# Patient Record
Sex: MALE | Race: WHITE | Employment: OTHER | URBAN - METROPOLITAN AREA
[De-identification: names, ages, dates, MRNs, and addresses within clinical notes are randomized per-mention and may not be internally consistent; named-entity substitution may affect disease eponyms.]

---

## 2018-10-06 ENCOUNTER — HOSPITAL ENCOUNTER (EMERGENCY)
Age: 83
Discharge: HOME | End: 2018-10-06
Payer: MEDICARE

## 2018-10-06 ENCOUNTER — APPOINTMENT (OUTPATIENT)
Dept: CT IMAGING | Age: 83
End: 2018-10-06
Payer: MEDICARE

## 2018-10-06 VITALS
RESPIRATION RATE: 17 BRPM | SYSTOLIC BLOOD PRESSURE: 143 MMHG | TEMPERATURE: 97.6 F | WEIGHT: 195 LBS | DIASTOLIC BLOOD PRESSURE: 72 MMHG | BODY MASS INDEX: 27.92 KG/M2 | HEIGHT: 70 IN | HEART RATE: 78 BPM | OXYGEN SATURATION: 99 %

## 2018-10-06 DIAGNOSIS — S01.01XA LACERATION OF SCALP, INITIAL ENCOUNTER: ICD-10-CM

## 2018-10-06 DIAGNOSIS — S09.90XA INJURY OF HEAD, INITIAL ENCOUNTER: Primary | ICD-10-CM

## 2018-10-06 PROCEDURE — 90715 TDAP VACCINE 7 YRS/> IM: CPT | Performed by: NURSE PRACTITIONER

## 2018-10-06 PROCEDURE — 99283 EMERGENCY DEPT VISIT LOW MDM: CPT

## 2018-10-06 PROCEDURE — 6370000000 HC RX 637 (ALT 250 FOR IP): Performed by: NURSE PRACTITIONER

## 2018-10-06 PROCEDURE — 70450 CT HEAD/BRAIN W/O DYE: CPT

## 2018-10-06 PROCEDURE — 72125 CT NECK SPINE W/O DYE: CPT

## 2018-10-06 PROCEDURE — 12013 RPR F/E/E/N/L/M 2.6-5.0 CM: CPT

## 2018-10-06 PROCEDURE — 6360000002 HC RX W HCPCS: Performed by: NURSE PRACTITIONER

## 2018-10-06 PROCEDURE — 90471 IMMUNIZATION ADMIN: CPT | Performed by: NURSE PRACTITIONER

## 2018-10-06 RX ADMIN — Medication 1 EACH: at 16:24

## 2018-10-06 RX ADMIN — TETANUS TOXOID, REDUCED DIPHTHERIA TOXOID AND ACELLULAR PERTUSSIS VACCINE, ADSORBED 0.5 ML: 5; 2.5; 8; 8; 2.5 SUSPENSION INTRAMUSCULAR at 16:53

## 2018-10-06 ASSESSMENT — PAIN DESCRIPTION - DESCRIPTORS: DESCRIPTORS: ACHING

## 2018-10-06 ASSESSMENT — PAIN DESCRIPTION - LOCATION: LOCATION: HEAD

## 2018-10-06 ASSESSMENT — PAIN SCALES - GENERAL: PAINLEVEL_OUTOF10: 8

## 2018-10-06 NOTE — ED PROVIDER NOTES
additional lacerations requiring repair. 1700: Patient resting with no distress or focal neurologic deficits. Medical Decision Making:    Patient presented with mechanical fall with head injury and scalp laceration. Wound was thoroughly cleaned and repaired with Dermabond. CT of the head and cervical spine was negative for acute pathology. Patient was negative for focal neurologic deficits and is appropriate for discharge and outpatient follow-up. Patient wife are instructed to return to the emergency department immediately with any new or worsening symptoms. Counseling: The emergency provider has spoken with the patient and spouse/SO and discussed todays results, in addition to providing specific details for the plan of care and counseling regarding the diagnosis and prognosis. Questions are answered at this time and they are agreeable with the plan. Assessment      1. Injury of head, initial encounter    2. Laceration of scalp, initial encounter      Plan   Discharge to home  Patient condition is good    New Medications     New Prescriptions    No medications on file     Electronically signed by MICHELLE Pantoja CNP   DD: 10/6/18  **This report was transcribed using voice recognition software. Every effort was made to ensure accuracy; however, inadvertent computerized transcription errors may be present.   END OF ED PROVIDER NOTE     MICHELLE Reese CNP  10/06/18 8988

## 2019-09-22 ENCOUNTER — HOSPITAL ENCOUNTER (EMERGENCY)
Age: 84
Discharge: HOME | End: 2019-09-22
Attending: EMERGENCY MEDICINE
Payer: MEDICARE

## 2019-09-22 ENCOUNTER — APPOINTMENT (OUTPATIENT)
Dept: GENERAL RADIOLOGY | Age: 84
End: 2019-09-22
Payer: MEDICARE

## 2019-09-22 ENCOUNTER — APPOINTMENT (OUTPATIENT)
Dept: CT IMAGING | Age: 84
End: 2019-09-22
Payer: MEDICARE

## 2019-09-22 VITALS
HEART RATE: 55 BPM | WEIGHT: 202 LBS | RESPIRATION RATE: 18 BRPM | OXYGEN SATURATION: 96 % | HEIGHT: 68 IN | TEMPERATURE: 98.4 F | DIASTOLIC BLOOD PRESSURE: 87 MMHG | BODY MASS INDEX: 30.62 KG/M2 | SYSTOLIC BLOOD PRESSURE: 123 MMHG

## 2019-09-22 DIAGNOSIS — S02.19XA CLOSED FRACTURE OF FRONTAL SINUS, INITIAL ENCOUNTER (HCC): ICD-10-CM

## 2019-09-22 DIAGNOSIS — S09.90XA CLOSED HEAD INJURY, INITIAL ENCOUNTER: ICD-10-CM

## 2019-09-22 DIAGNOSIS — S49.91XA INJURY OF RIGHT SHOULDER, INITIAL ENCOUNTER: ICD-10-CM
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
## 2019-09-22 PROCEDURE — 73060 X-RAY EXAM OF HUMERUS: CPT

## 2019-09-22 PROCEDURE — 99284 EMERGENCY DEPT VISIT MOD MDM: CPT

## 2019-09-22 PROCEDURE — 72125 CT NECK SPINE W/O DYE: CPT

## 2019-09-22 PROCEDURE — 70450 CT HEAD/BRAIN W/O DYE: CPT

## 2019-09-22 ASSESSMENT — ENCOUNTER SYMPTOMS
NAUSEA: 0
ABDOMINAL PAIN: 0
SHORTNESS OF BREATH: 0
SORE THROAT: 0
COUGH: 0
DIARRHEA: 0
VOMITING: 0
BACK PAIN: 0
CONSTIPATION: 0
BLOOD IN STOOL: 0

## 2019-09-22 ASSESSMENT — PAIN DESCRIPTION - ORIENTATION: ORIENTATION: RIGHT

## 2019-09-22 ASSESSMENT — PAIN SCALES - GENERAL: PAINLEVEL_OUTOF10: 10

## 2019-09-22 ASSESSMENT — PAIN DESCRIPTION - LOCATION: LOCATION: SHOULDER

## 2019-09-22 ASSESSMENT — PAIN DESCRIPTION - DESCRIPTORS: DESCRIPTORS: ACHING

## 2019-09-22 ASSESSMENT — PAIN DESCRIPTION - PAIN TYPE: TYPE: ACUTE PAIN

## 2019-09-22 NOTE — ED PROVIDER NOTES
Patient is an 72-year-old male with history of arthritis, who is presenting for evaluation after fall. Fall occurred yesterday at 1800, patient was walking through a doorway missed the step and fell forward into the right. Patient states he scraped his head up on gravel and landed on his right shoulder. Notes this morning he has decreased movement of right arm as well as headache. Denies any headache, dizziness, lightheadedness, chest pain, shortness of breath, nausea, vomiting. No amnesia to the event. He did not lose consciousness is not on any blood thinners. He has been ambulatory since the event. The history is provided by the patient. Fall   The accident occurred yesterday. The fall occurred while walking. Impact surface: gravel. The point of impact was the head, right shoulder and right elbow. The pain is present in the head and right shoulder. The pain is moderate. He was ambulatory at the scene. There was no entrapment after the fall. There was no drug use involved in the accident. There was no alcohol use involved in the accident. Associated symptoms include headaches. Pertinent negatives include no fever, no numbness, no abdominal pain, no nausea and no vomiting. Review of Systems   Constitutional: Negative for chills and fever. HENT: Negative for sore throat. Eyes: Negative for visual disturbance. Respiratory: Negative for cough and shortness of breath. Cardiovascular: Negative for chest pain. Gastrointestinal: Negative for abdominal pain, blood in stool, constipation, diarrhea, nausea and vomiting. Genitourinary: Negative for difficulty urinating, dysuria and urgency. Musculoskeletal: Negative for back pain. Right arm pain     Skin: Negative for rash. Neurological: Positive for headaches. Negative for dizziness and numbness. Physical Exam   Constitutional: He is oriented to person, place, and time. He appears well-developed and well-nourished.  No medical, family and social history unless otherwise noted. I have discussed this patient in detail with the resident and provided the instruction and education regarding the evidence-based evaluation and treatment of [unfilled]  History: patient tripped and fell yesterday. He struck his forehead on pavement and injured his right shoulder. He denies LOC, neck or back pain. His tetanus is UTD. My findings: Sapna Horne is a 82 y.o. male who is in no distress. Physical exam reveals head with abrasions of the forehead and ecchymosis of the right orbit. Tenderness of the right anterior shoulder with decreased ROM secondary to pain. Distal motion, pulses and sensation intact. No focal neurologic deficits. No midline spine tenderness. My plan: Symptomatic and supportive care. Patient to have imaging. Electronically signed by Rosie Welsh DO on 9/22/19 at 10:07 AM          [JS]   1105 Repeat evaluation, patient continues to remain non-toxic. EOM intact without deficit. [BB]      ED Course User Index  [BB] Demetrius Perez DO  [JS] Rosie Welsh DO       --------------------------------------------- PAST HISTORY ---------------------------------------------  Past Medical History:  has a past medical history of Cancer ([facility] [address] and Prostate irregularity. Past Surgical History:  has a past surgical history that includes hernia repair; Skin cancer excision; and Leg Surgery (Left, 2011). Social History:  reports that he has never smoked. He has never used smokeless tobacco. He reports that he does not drink alcohol or use drugs. Family History: family history is not on file. The patients home medications have been reviewed. Allergies: Patient has no known allergies. -------------------------------------------------- RESULTS -------------------------------------------------  Labs:  No results found for this visit on 09/22/19.     Radiology:  XR HUMERUS RIGHT (MIN 2 VIEWS)   Final Result   No acute findings. CT Head WO Contrast   Final Result   1. No acute intracranial abnormality. Right mastoid fluid without   fracture. 2. Scalp swelling right frontal region with fracture of the anterior   wall of the right frontal sinus. CT Cervical Spine WO Contrast   Final Result   1. 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 2. 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 This was   not identified on the prior CT scan of the cervical spine 10/6/2018.                ------------------------- NURSING NOTES AND VITALS REVIEWED ---------------------------  Date / Time Roomed:  9/22/2019  9:29 AM  ED Bed Assignment:  01/01    The nursing notes within the ED encounter and vital signs as below have been reviewed. /87   Pulse 55   Temp 98.4 °F (36.9 °C) (Oral)   Resp 18   Ht 5' 8\" (1.727 m)   Wt 202 lb (91.6 kg)   SpO2 96%   BMI 30.71 kg/m²   Oxygen Saturation Interpretation: Normal      ------------------------------------------ PROGRESS NOTES ------------------------------------------  9:05 AM  I have spoken with the patient and discussed todays results, in addition to providing specific details for the plan of care and counseling regarding the diagnosis and prognosis. Their questions are answered at this time and they are agreeable with the plan. I discussed at length with them reasons for immediate return here for re evaluation. They will followup with their [facility] and primary care physician by calling their office tomorrow. --------------------------------- ADDITIONAL PROVIDER NOTES ---------------------------------  At this time the patient is without objective evidence of an acute process requiring hospitalization or inpatient management. They have remained hemodynamically stable throughout their entire ED visit and are stable for discharge with outpatient follow-up.      The plan has been discussed in detail and they are aware of

## 2020-01-27 ENCOUNTER — HOSPITAL ENCOUNTER (EMERGENCY)
Age: 85
Discharge: HOME | End: 2020-01-27
Payer: MEDICARE

## 2020-01-27 VITALS
BODY MASS INDEX: 30.11 KG/M2 | RESPIRATION RATE: 20 BRPM | DIASTOLIC BLOOD PRESSURE: 78 MMHG | TEMPERATURE: 98 F | HEART RATE: 85 BPM | WEIGHT: 198 LBS | SYSTOLIC BLOOD PRESSURE: 131 MMHG | OXYGEN SATURATION: 95 %

## 2020-01-27 PROCEDURE — 99212 OFFICE O/P EST SF 10 MIN: CPT

## 2020-01-27 RX ORDER — AMOXICILLIN AND CLAVULANATE POTASSIUM 875; 125 MG/1; MG/1
1 TABLET, FILM COATED ORAL 2 TIMES DAILY
Qty: 20 TABLET | Refills: 0 | Status: SHIPPED | OUTPATIENT
Start: 2020-01-27 | End: 2020-02-06

## 2020-01-27 NOTE — ED PROVIDER NOTES
Department of Emergency Medicine   [address] [place] [address]  Provider Note  Admit Date/RoomTime: 1/27/2020  8:58 AM  Room: 04/04  Chief Complaint   Sinusitis (started week ago   cough  sinus pressure  congestion )    History of Present Illness   Source of history provided by:  patient. History/Exam Limitations: none. Tani Hallman is a 82 y.o. old male who has a past medical history of:   Past Medical History:   Diagnosis Date    Cancer ([address]     skin    Prostate irregularity    presents to the urgent care center by private vehicle, for his congestion and drainage and a dry throat. He said he does not have chest pain or shortness of breath. He said it started over a week ago and he is just not getting over it and now it is turned to green color with the sinus drainage. He denies chest pain shortness of breath or body aches. ROS    Pertinent positives and negatives are stated within HPI, all other systems reviewed and are negative. Past Surgical History:   Procedure Laterality Date    HERNIA REPAIR      LEG SURGERY Left 2011    SKIN CANCER EXCISION     Social History:  reports that he has never smoked. He has never used smokeless tobacco. He reports that he does not drink alcohol or use drugs. Family History: family history is not on file. Allergies: Patient has no known allergies. Physical Exam            ED Triage Vitals [01/27/20 0859]   BP Temp Temp Source Pulse Resp SpO2 Height Weight   131/78 98 °F (36.7 °C) Oral 85 20 95 % -- 198 lb (89.8 kg)      Oxygen Saturation Interpretation: Normal.    Constitutional:  Alert, development consistent with age. Ears:  External Ears: Bilateral normal.               TM's & External Canals: normal appearance, normal TMs bilaterally. Nose:   There is no discharge, swelling or lesions noted. Sinuses: no Bilateral maxillary sinus tenderness. no Bilateral frontal sinus tenderness.   Mouth:  normal tongue and buccal [facility] [id number]  [phone]      As needed   Patient condition is good    New Medications     New Prescriptions    AMOXICILLIN-CLAVULANATE (AUGMENTIN) 875-125 MG PER TABLET    Take 1 tablet by mouth 2 times daily for 10 days     Electronically signed by MICHELLE Perez CNP   DD: 1/27/20  **This report was transcribed using voice recognition software. Every effort was made to ensure accuracy; however, inadvertent computerized transcription errors may be present.   END OF ED PROVIDER NOTE     MICHELLE Perez CNP  01/27/20 2749

## 2020-12-28 ENCOUNTER — EVALUATION (OUTPATIENT)
Dept: OCCUPATIONAL THERAPY | Age: 85
End: 2020-12-28
Payer: MEDICARE

## 2020-12-28 PROCEDURE — 97110 THERAPEUTIC EXERCISES: CPT | Performed by: OCCUPATIONAL THERAPIST

## 2020-12-28 PROCEDURE — 97165 OT EVAL LOW COMPLEX 30 MIN: CPT | Performed by: OCCUPATIONAL THERAPIST

## 2020-12-28 NOTE — PROGRESS NOTES
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 Nalini Baker   Date of Surgery/Injury: no injury - long standing duration  Plan of care signed (Y/N): N  Visit# / total visits:      Past Medical History:   Past Medical History:   Diagnosis Date    Cancer ([facility] [address]     skin    Prostate irregularity      Past Surgical History:   Past Surgical History:   Procedure Laterality Date    HERNIA REPAIR      LEG SURGERY Left     SKIN CANCER EXCISION         Reason for Referral: Pt has long standing H/o OA in both his hands with his L being worse. He has a trigger finger release in his MF  On his L hand about 5 years ago and did well with that recovery. He has noticed about 6 months ago that  his L hand RF was beginning to get 'stuck ' in flexion in the AM and has briggs pain. He has been wearing a finger splint ( reversed swan neck ) at night so he is not fisting in the am.  He is also going to be seen in PT for neck and R shoulder. Pt c/o decreased strength in his L hand and soreness in his L RF and thumb ( [facility]). Home Living: lives in a house with his wife.    Prior Level of Function: Indep     Cognition:   Alert/Oriented x3     IADL STATUS:   Ind Mod I Min A Mod A Max A Dep Other   Homemaking Responsibility x         Shopping Responsibility: x         Mode of Transportation: x         Leisure & Hobbies:  x        Work:       x Comments: PT's wife it the primary cook and . He has difficulty opening jars. He does the yard work and snow removal with a riding mower and snow plow in his tractor. Hobbies include working on house projects, working on cars, and [address]. He has difficulty holding tightly onto a wrench or screw  with his L hand. Pt is retired. ADL STATUS:   Ind Mod I Min A Mod A Max A Dep Other   Feeding: x         Grooming: x         Bathing: x         UE Dressing: x         LE Dressing: x         Toileting: x         Transfers: x           Comments: NA    Pain Level: Pt states the  pain at rest in his L hand ranges from 0 - 3/10.  - achy and also has a sore L CMC area. Pain with light ADL tasks ranges from 0 - 4/10. Pain with resistive tasks from 5-8/10. Pain is 8/10 when trying to get his wallet of of his L back pocket with his L hand. He uses an asprin topical lotion to decrease pain and heat. UE Assessment:  L UE has AROM and MG WFL's in his shoulder, elbow, forearm and wrist.  Digital motion is WFL's with limitations as follows:  DPC: All digits WNL's except RF at 1 cm              RF MP ext -25*                    PIP ext -5*  Comment: Hand Dominance is Right     Sensation: WNL's   Able To Sense (Y) / Unable to Sense (N)  SEMMES-KRISTY Thumb 2nd Digit 3rd Digit  4th Digit  5th Digit    Normal Touch  Size: 2.83        Diminished Light Touch   Size: 3.61        Diminished Protective Sense  Size: 4.31        Loss of Protective Sense   Size: 4.56        Loss of Sensation  Size: 6.65            Edema Description/Circumferential Measurements:   Pt has slight edema in all digits in his L hand.      Dynamometer (setting 2):     Left: 25#      Right: 84#      Pinch Meter:   Lateral: Left= 14# ,    Right= 17#    Palmar 3 point: Left= 6#,    Right= 10#    QuickDASH Score:   % disability ( will assess next session) Intervention: Pt was given  HEP - see attached sheet. This included instructions of positions to avoid to allow his trigger finger to heal - no fisting, no resistive gripping, no tight gripping. Pt was given tendon gliding exercises - MP flex/ext and hook fisting only. He is to continue to wear his night ext splint. Therapist also instructed him on using ice tot he A -1 pulley area 3 x's a day either with direct icing or an ice pack. Pt appeared to understand all instructions. Goals were mutually agreed upon with the patient. Therapist explained why strengthening was not a goal at this time - to let his trigger finger heal.      Assessment of current deficits   Functional mobility []  ADLs [] Strength [x]  Cognition []  Functional transfers  [] IADLs [] Safety Awareness []  Endurance []  Fine Motor Coordination [] Balance [] Vision/perception [] Sensation []   Gross Motor Coordination [] ROM [x] Pain [x]  Edema [x]      Eval Complexity: LOW level eval charged, there exer x's one. Profile and History- history from pt and physician notes  Assessment of Occupational Performance and Identification of Deficits- PT has 4 functional deficits.     Clinical Decision Making- no adpatations needed    Rehab Potential:                                 [x] Good  [] Fair  [] Poor        Suggested Professional Referral:       [x] No  [] Yes:  Barriers to Goal Achievement[de-identified]          [x] No  [] Yes:  Domestic Concerns:                           [x] No  [] Yes:     Goal Formulation: Patient  Time In: 9:00 am             Time Out: 9:55 am                      Timed Code Treatment Minutes:  55 minutes     PLAN      Treatment to include:   [x] Instruction in HEP                   Modalities:  [x] Therapeutic Exercise        [] Ultrasound               [] Electrical Stimulation/Attended  [x] PROM/Stretching                    [x] Fluidotherapy          [x]  Paraffin [] AAROM  [x] AROM                 [] Iontophoresis: 4 mg/mL; Dexamethasone Sodium                                        [] Neuromuscular Re-education [] Splinting         [] Desensitization          [x] Therapeutic Activity       [x] Pain Management with/without modalities PRN                 [x] Manual Therapy/Fascial release                            [x] Tendon Glides        [x]Joint Protection/Training  []Ergonomics                             [x] Joint Mobilization        [] Adaptive Equipment Assessment/Training                             [x] Manual Edema Mobilization    [] Energy Conservation/Work Simplification  [x] GM/FM Coordination       [] Safety retraining/education per  individual diagnosis/goals  [] Scar Management        [] ADL/IADL re-training       Patient Specific Goal: To get his L hand stronger and help it so it does not get 'stuck' in flexion. GOALS (Long term same as Short term):  1. ) Patient will demonstrate good understanding of home program (exercises/activities/diagnosis/prognosis/goals) with good accuracy. 2. ) Patient will demonstrate increased active/passive range of motion of their L RF to [facility] for ADL/IADL completion. 3. ) Patient will demonstrate increased /pinch strength of at least 10 / 5 pinch pounds of their L hand. 4. ) Patient to report decreased pain in their affected Left  upper extremity from 8/10 to 2/10 or less with resistive functional use.   5, ) Patient will be knowledgeable of edema control techniques as evident with decreases from slight  to mild/none. 6. ) Patient will decrease QuickDASH score to 10% or less for increased participation in daily functional activities. Patient. Education:  [x] Plans/Goals, Risks/Benefits discussed  [x] Home exercise program  Method of Education: [x] Verbal  [x] Demo  [x] Written  Comprehension of Education:  [x] Verbalizes understanding. [x] Demonstrates understanding.

## 2020-12-29 PROBLEM — M65.342 TRIGGER FINGER, LEFT RING FINGER: Status: ACTIVE | Noted: 2020-12-29

## 2020-12-29 PROBLEM — M19.042 PRIMARY OSTEOARTHRITIS OF LEFT HAND: Status: ACTIVE | Noted: 2020-12-29

## 2021-01-05 ENCOUNTER — TREATMENT (OUTPATIENT)
Dept: OCCUPATIONAL THERAPY | Age: 86
End: 2021-01-05
Payer: MEDICARE

## 2021-01-05 ENCOUNTER — EVALUATION (OUTPATIENT)
Dept: PHYSICAL THERAPY | Age: 86
End: 2021-01-05
Payer: MEDICARE

## 2021-01-05 DIAGNOSIS — M19.042 PRIMARY OSTEOARTHRITIS OF LEFT HAND: ICD-10-CM

## 2021-01-05 DIAGNOSIS — M65.342 TRIGGER FINGER, LEFT RING FINGER: Primary | ICD-10-CM

## 2021-01-05 DIAGNOSIS — M54.2 NECK PAIN: ICD-10-CM

## 2021-01-05 DIAGNOSIS — M25.511 RIGHT SHOULDER PAIN, UNSPECIFIED CHRONICITY: Primary | ICD-10-CM

## 2021-01-05 PROCEDURE — 97162 PT EVAL MOD COMPLEX 30 MIN: CPT | Performed by: PHYSICAL THERAPIST

## 2021-01-05 PROCEDURE — 97110 THERAPEUTIC EXERCISES: CPT | Performed by: OCCUPATIONAL THERAPIST

## 2021-01-05 PROCEDURE — 97760 ORTHOTIC MGMT&TRAING 1ST ENC: CPT | Performed by: OCCUPATIONAL THERAPIST

## 2021-01-05 PROCEDURE — 97140 MANUAL THERAPY 1/> REGIONS: CPT | Performed by: OCCUPATIONAL THERAPIST

## 2021-01-05 PROCEDURE — 97110 THERAPEUTIC EXERCISES: CPT | Performed by: PHYSICAL THERAPIST

## 2021-01-05 NOTE — PROGRESS NOTES
Physical Therapy Daily Treatment Note    Date: 2021  Patient Name: Violeta Candelario  : 1935   MRN: [id number]  DOInjury: 2 months ago  DOSx: None   Referring Provider: No referring provider defined for this encounter. Medical Diagnosis:      Diagnosis Orders   1. Right shoulder pain, unspecified chronicity     2. Neck pain         Outcome Measure: Quick Dash: 34% Impairment    S: See eval  O: Pt given written HEP  Time 9797-3537     Visit 1 Repeat outcome measure at mid point and end. Pain 5/10     ROM See eval     Modalities            Manual 10 mins flex, abd, IR, ER                 Stretch      Table slides      Wall Walk Flex      Wall Walk ABD      Wall ER Stretch      Towel IR Stretch      Supine Stretch with Arms Behind Head            Exercise      UBE          Pulleys - Flex      Pulleys - IR      Supine Wand Flex 15 reps x 1 set      Supine Wand Bench Press 15 reps x 1 set      Supine Wand ER/IR 15 reps x 1 set      Standing Wand IR 15 reps x 1 set      Standing Wand Scaption 15 reps x 1 set      Standing Wand Flex      Standing Wand ER/IR      Shrugs AROM       Pendulum Ex            Supine Flex      S-lying ABD      S-lying ER            Prone Flexion      Prone Ext      Prone Row with ER      Prone Horizontal ABD            Standing Flex      Standing ER/IR      Standing Scaption       Standing ABD      Standing Shoulder Press       Functional activities To aid in ROM and strength needed for reaching , lifting ,pushing and pulling at home/work    ROWS: H       ROWS: M  \"    ROWS: L  \"    ER  \"    IR  \"    Shoulder Flex      Shoulder ABD            Weighted Machines      Lat Pull Down      Vertical Row      A:  Tolerated well. Above added to written HEP.   P: Continue with rehab plan  Steven Santos PT    Treatment Charges: Mins Units   Initial Evaluation 30 1   Re-Evaluation     Ther Exercise         TE 15 1   Manual Therapy     MT     Ther Activities        TA Gait Training          GT     Neuro Re-education NR     Modalities     Non-Billable Service Time     Other     Total Time/Units 45 2

## 2021-01-05 NOTE — PROGRESS NOTES
[facility] OUTPATIENT REHABILITATION  PHYSICAL THERAPY INITIAL EVALUATION         Date:  2021   Patient: Mitch Candelario  : 1935  MRN: [id number]  Referring Provider: No referring provider defined for this encounter. Medical Diagnosis:      Diagnosis Orders   1. Right shoulder pain, unspecified chronicity     2. Neck pain          SUBJECTIVE:     Onset date: 2 months prior. Onset[de-identified] Sudden onset    Mechanism of Injury / History: Pt was leaning over going to  something off the ground and fell onto his R shoulder. XR was negative but pt still c/o pain and dysfunction since event. Patient is right handed. Previous PT: none    Medical Management for Current Problem: OTC meds     Chief complaint: pain, decreased motion, decreased mobility and weakness    Behavior: condition is staying the same    Pain: intermittent  Current: 2/10     Best: 0/10     Worst:7/10    Symptom Type/Quality: aching, sharp, burning   Location[de-identified] noted above anterior, mid-deltoid region      Aggravated by: reaching overhead, reaching out, reaching behind back, lifting/carrying/material handling    Relieved by: medication      Imaging results: No results found. Past Medical History:  Past Medical History:   Diagnosis Date    Cancer ([address]     skin    Prostate irregularity      Past Surgical History:   Procedure Laterality Date    HERNIA REPAIR      LEG SURGERY Left     SKIN CANCER EXCISION         Medications:   Current Outpatient Medications   Medication Sig Dispense Refill    Cholecalciferol (VITAMIN D) 2000 units CAPS capsule Take 1 capsule by mouth daily      doxazosin (CARDURA) 4 MG tablet Take 4 mg by mouth nightly      esomeprazole Magnesium (NEXIUM) 20 MG PACK Take 20 mg by mouth daily       No current facility-administered medications for this visit. Occupation: retired. Physical demands include: n/a. Status: n/a.     Exercise regimen: none Hobbies: yard work, working around the house    Patient Goals: pain relief, return to prior activity, get back to normal    Precautions/Contraindications: none    OBJECTIVE:     Observations: well nourished male    Inspection: irregular scapulohumeral rhythm right shoulder                 Palpation: Tender to palpation anterior mid deltoid     Joint/Motion:  Right Shoulder:  AROM: 130° Forward elevation,  75° ER,  IR to 30  PROM: 140° Forward elevation,  85° ER,  40° IR    Left Shoulder:  AROM: WNL° Forward elevation,  WNL° ER,  IR to WNL  PROM: WNL° Forward elevation,  WNL° ER , WNL° IR    Strength:  Right Shoulder: Flexion 3-/5,  Abduction 3-/5, ER 3-/5, IR 3-/5      Left Shoulder: Flexion 4-/5,  Abduction 4-/5, ER 4-/5, IR 4-/5       Special Tests/Functional Screens:    [] Speedy []+ / [] -  [] Watonwan's []+ / [] -   []  Tex's []+ / [] -    []GH drawer []+ / [] -    [] Bicep Load []+ / [] -   [] Crank []+ / [] -  [] [facility] []+ / [] -   [] Elbow Varus []+ / [] -  [] Neer's []+ / [] -      [] Speed's []+ / [] -   []Sulcus Sign []+ / [] -   [] Apprehension []+ / [] -   [] Bicep Load II []+ / [] -   [] Jocelyne Trevizo []+ / [] -   [] Elbow Valgus []+ / [] -     [] Other: []+ / [] -         ASSESSMENT     Outcome Measure:   QuickDASH (Disorders of the Arm, Shoulder, and Hand) 34% disability    Problems:   ? Pain reported 7/10  ? ROM decreased  ? Strength decreased 3-/5 R shoulder  ? Decreased functional ability with walking, stairs, sitting, standing, ADLs, use of right upper extremity, reaching, lifting, carrying    [x] There are no barriers affecting plan of care or recovery    [] Barriers to this patient's plan of care or recovery include. Domestic Concerns:  [x] No  [] Yes:    Short Term goals (2-3 weeks)  ? Decrease reported pain to 4/10  ? Increase ROM to WNL  ?  Increase Strength to 3+/5 R shoulder Patient understands diagnosis/prognosis and consents to treatment, plan and goals: [x] Yes    [] No     Thank you for the opportunity to work with your patient. If you have questions or comments, please contact me at [phone]; fax: [phone]. Electronically signed by: Indy Ingram, PT    Medicare Patients Only     Please sign Physician's Certification and return to: [address] [address]  Dept: [phone]  Dept Fax: [phone] Certification / Comments     Frequency/Duration 1-2 days per week for 4-6 weeks. Certification period from 1/5/2021  to 4/4/2021. I have reviewed the Plan of Care established for skilled therapy services and certify that the services are required and that they will be provided while the patient is under my care.     Physician's Comments/Revisions:               Physician's Printed Name:                                           [de-identified] Signature:                                                               Date:

## 2021-01-05 NOTE — PROGRESS NOTES
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 Adeola Frank   Date of Surgery/Injury: no injury - long standing duration  Plan of care signed (Y/N): N  Visit# / total visits:      Pain Level: moderate,  Sore, tight (pulling) and uncomfortable    Subjective: \" My finger has been really sore since I started doing the exercises. .\"  Objective:  Updated POC to be completed by visit 8. INTERVENTION: COMPLETED: SPECIFICS/COMMENTS:   Modality:     MH x hand x 5 min as preconditioning prior to ex        AROM:     L hand AROM/ tendon glides x Focus on isolated MCP flexion/ ext, PIP/DIP curls using red dowel        AAROM:               PROM/Stretching:               Manual techniques:     Soft tissue mob x Ring finger- full including over A1 pulley        Strengthening:               Other:     Splinting- NEW x Hand based ring MP blocking splint fabricated for use during home PT exercises and other tasks to prevent triggering. Assessment/Comments: Pt is making Good progress toward stated plan of care. Tx completed with a focus on the trigger finger. Home AROM program modified some to prevent excessive soreness. Pt reported increased triggering in the affected finger when doing PT exercises for his shoulder. A hand based splint was fabricated to prevent triggering while permitting functional hand use.      -Rehab Potential: Good  -Requires OT Follow Up: Yes  Time In:1405            Time Out: 1505             Visit #: 2    Treatment Charges: Mins Units Modalities     Ther Exercise 15 1   Manual Therapy 15 1   Thera Activities     ADL/Home Mgt      Neuro Re-education     Gait Training     Group Therapy     Non-Billable Service Time: MH 5    Other: ortho fit 25 2   Total Time/Units 60 4       -Response to Treatment: Pt is happy with his progress. He states he has a MD appointment setup to see Dr Cynthia Ye. Goals: Goals for pt can be see on initial eval occurring on 12-28-20    Plan:   [x]  Continue Plan of care: Contiunue pain mgmt, ROM and splint checks as needed. Treatment covered based on POC and graduated to patient's progress. Pt education continues at each visit to obtain maximum benefits from skilled OT intervention.   []  [facility] of care:   []  Discharge:      Trixie Byrd OT/L  [id number]

## 2021-01-07 ENCOUNTER — TREATMENT (OUTPATIENT)
Dept: PHYSICAL THERAPY | Age: 86
End: 2021-01-07
Payer: MEDICARE

## 2021-01-07 DIAGNOSIS — M54.2 NECK PAIN: ICD-10-CM

## 2021-01-07 DIAGNOSIS — M25.511 RIGHT SHOULDER PAIN, UNSPECIFIED CHRONICITY: Primary | ICD-10-CM

## 2021-01-07 PROCEDURE — 97110 THERAPEUTIC EXERCISES: CPT

## 2021-01-07 NOTE — PROGRESS NOTES
Physical Therapy Daily Treatment Note    Date: 2021  Patient Name: Estuardo Branham  : 1935   MRN: [id number]  DOInjury: 2 months ago  DOSx: None   Referring Provider:  Geronimo Howell MD    Medical Diagnosis:      Diagnosis Orders   1. Right shoulder pain, unspecified chronicity     2. Neck pain         Outcome Measure: Quick Dash: 34% Impairment    S: Pt reports increased soreness for a couple days from doing HEP twice daily. This AM he felt slightly better  O: Pt given written HEP  Time 0008-5601     Visit 2 Repeat outcome measure at mid point and end. Pain 5/10     ROM See eval     Modalities      HP X 10 min after Tx R shoulder    Manual                  Stretch      Table slides X 15 flexion  X 15 scaption     Wall Walk Flex      Wall Walk ABD      Wall ER Stretch      Towel IR Stretch      Supine Stretch with Arms Behind Head            Exercise      UBE          Pulleys - Flex      Pulleys - IR      Supine Wand Flex 15 reps x 1 set      Supine Wand Bench Press 15 reps x 1 set      Supine Wand ER/IR 15 reps x 1 set      Standing Wand IR 15 reps x 1 set      Standing Wand Scaption      Standing Wand Flex      Standing Wand ER/IR      Shrugs AROM       Pendulum Ex X 2 min           Supine Flex      S-lying ABD      S-lying ER            Prone Flexion      Prone Ext      Prone Row with ER      Prone Horizontal ABD            Standing Flex      Standing ER/IR      Standing Scaption       Standing ABD      Standing Shoulder Press       Functional activities To aid in ROM and strength needed for reaching , lifting ,pushing and pulling at home/work    ROWS: H       ROWS: M  \"    ROWS: L  \"    ER  \"    IR  \"    Shoulder Flex      Shoulder ABD            Weighted Machines      Lat Pull Down      Vertical Row      A:  Tolerated well. Above added to written HEP.   P: Continue with rehab plan  Lin Arvizu PTA    Treatment Charges: Mins Units   Initial Evaluation     Re-Evaluation Ther Exercise         TE 35 2   Manual Therapy     MT     Ther Activities        TA     Gait Training          GT     Neuro Re-education NR     Modalities     Non-Billable Service Time 10 HP 0   Other     Total Time/Units 45 2

## 2021-01-11 ENCOUNTER — TREATMENT (OUTPATIENT)
Dept: PHYSICAL THERAPY | Age: 86
End: 2021-01-11
Payer: MEDICARE

## 2021-01-11 DIAGNOSIS — M25.511 RIGHT SHOULDER PAIN, UNSPECIFIED CHRONICITY: Primary | ICD-10-CM

## 2021-01-11 DIAGNOSIS — M54.2 NECK PAIN: ICD-10-CM

## 2021-01-11 PROCEDURE — 97110 THERAPEUTIC EXERCISES: CPT

## 2021-01-11 NOTE — PROGRESS NOTES
Physical Therapy Daily Treatment Note    Date: 2021  Patient Name: Juanita Pereyra  : 1935   MRN: [id number]  DOInjury: 2 months ago  DOSx: None   Referring Provider:  Cece Bartlett MD    Medical Diagnosis:      Diagnosis Orders   1. Right shoulder pain, unspecified chronicity     2. Neck pain         Outcome Measure: Quick Dash: 34% Impairment    S: Pt reports shoulder feels better. Pain has decreased, but still sore with overhead movements  O: Pt given written HEP  Time 7233-7368     Visit 3 Repeat outcome measure at mid point and end. Pain 5/10     ROM See eval     Modalities      HP  R shoulder    Manual                  Stretch      Table slides      Wall Walk Flex      Wall Walk ABD      Wall ER Stretch      Towel IR Stretch      Supine Stretch with Arms Behind Head            Exercise      UBE          Pulleys - Flex X 3 min     Pulleys - IR X 3 min     Supine Wand Flex 15 reps x 1 set      Supine Wand Bench Press 15 reps x 1 set      Supine Wand ER/IR 15 reps x 1 set      Standing Wand IR 15 reps x 1 set      Standing Wand Scaption 15 reps x 1 set     Standing Wand Flex      Standing Wand ER/IR      Shrugs AROM       Pendulum Ex            Supine Flex NEXT     S-lying ABD      S-lying ER NEXT           Prone Flexion      Prone Ext      Prone Row with ER      Prone Horizontal ABD            Standing Flex X 15     Standing ER/IR X 15 W/ towel under arm    Standing Scaption       Standing ABD      Standing Shoulder Press       Functional activities To aid in ROM and strength needed for reaching , lifting ,pushing and pulling at home/work    ROWS: H       ROWS: M  \"    ROWS: L  \"    ER  \"    IR  \"    Shoulder Flex      Shoulder ABD            Weighted Machines      Lat Pull Down      Vertical Row      A:  Tolerated well.     P: Continue with rehab plan  Cesar Parmar PTA    Treatment Charges: Mins Units   Initial Evaluation     Re-Evaluation     Ther Exercise         TE 35 2 Manual Therapy     MT     Ther Activities        TA     Gait Training          GT     Neuro Re-education NR     Modalities     Non-Billable Service Time     Other     Total Time/Units 35 2

## 2021-01-12 ENCOUNTER — TELEPHONE (OUTPATIENT)
Dept: OCCUPATIONAL THERAPY | Age: 86
End: 2021-01-12

## 2021-01-12 NOTE — TELEPHONE ENCOUNTER
Patient has cancelled OT for 1-15-21 due to poor tolerance for therapy. He wishes to be placed on hold pending consult with Dr Gurvinder Oscar.

## 2021-02-19 ENCOUNTER — TELEPHONE (OUTPATIENT)
Dept: ORTHOPEDIC SURGERY | Age: 86
End: 2021-02-19

## 2021-02-19 DIAGNOSIS — M65.342 TRIGGER FINGER, LEFT RING FINGER: Primary | ICD-10-CM

## 2021-02-22 ENCOUNTER — OFFICE VISIT (OUTPATIENT)
Dept: ORTHOPEDIC SURGERY | Age: 86
End: 2021-02-22
Payer: MEDICARE

## 2021-02-22 VITALS — TEMPERATURE: 97.1 F | WEIGHT: 180 LBS | HEIGHT: 70 IN | RESPIRATION RATE: 22 BRPM | BODY MASS INDEX: 25.77 KG/M2

## 2021-02-22 DIAGNOSIS — M65.342 TRIGGER FINGER, LEFT RING FINGER: ICD-10-CM

## 2021-02-22 DIAGNOSIS — M65.321 ACQUIRED TRIGGER FINGER OF RIGHT INDEX FINGER: ICD-10-CM

## 2021-02-22 DIAGNOSIS — M18.12 PRIMARY OSTEOARTHRITIS OF FIRST CARPOMETACARPAL JOINT OF LEFT HAND: ICD-10-CM

## 2021-02-22 DIAGNOSIS — M79.641 RIGHT HAND PAIN: Primary | ICD-10-CM

## 2021-02-22 PROCEDURE — G8484 FLU IMMUNIZE NO ADMIN: HCPCS | Performed by: ORTHOPAEDIC SURGERY

## 2021-02-22 PROCEDURE — G8417 CALC BMI ABV UP PARAM F/U: HCPCS | Performed by: ORTHOPAEDIC SURGERY

## 2021-02-22 PROCEDURE — 20600 DRAIN/INJ JOINT/BURSA W/O US: CPT | Performed by: ORTHOPAEDIC SURGERY

## 2021-02-22 PROCEDURE — 20550 NJX 1 TENDON SHEATH/LIGAMENT: CPT | Performed by: ORTHOPAEDIC SURGERY

## 2021-02-22 PROCEDURE — 1036F TOBACCO NON-USER: CPT | Performed by: ORTHOPAEDIC SURGERY

## 2021-02-22 PROCEDURE — 4040F PNEUMOC VAC/ADMIN/RCVD: CPT | Performed by: ORTHOPAEDIC SURGERY

## 2021-02-22 PROCEDURE — 1123F ACP DISCUSS/DSCN MKR DOCD: CPT | Performed by: ORTHOPAEDIC SURGERY

## 2021-02-22 PROCEDURE — G8427 DOCREV CUR MEDS BY ELIG CLIN: HCPCS | Performed by: ORTHOPAEDIC SURGERY

## 2021-02-22 PROCEDURE — 99203 OFFICE O/P NEW LOW 30 MIN: CPT | Performed by: ORTHOPAEDIC SURGERY

## 2021-02-22 RX ORDER — BETAMETHASONE SODIUM PHOSPHATE AND BETAMETHASONE ACETATE 3; 3 MG/ML; MG/ML
18 INJECTION, SUSPENSION INTRA-ARTICULAR; INTRALESIONAL; INTRAMUSCULAR; SOFT TISSUE ONCE
Status: COMPLETED | OUTPATIENT
Start: 2021-02-22 | End: 2021-02-22

## 2021-02-22 RX ORDER — IBUPROFEN 200 MG
400 TABLET ORAL EVERY 6 HOURS PRN
COMMUNITY

## 2021-02-22 RX ADMIN — BETAMETHASONE SODIUM PHOSPHATE AND BETAMETHASONE ACETATE 18 MG: 3; 3 INJECTION, SUSPENSION INTRA-ARTICULAR; INTRALESIONAL; INTRAMUSCULAR; SOFT TISSUE at 17:30

## 2021-02-22 NOTE — PROGRESS NOTES
Department of Orthopedic Surgery/Hand Surgery  Resident Office Note        CHIEF COMPLAINT:    Chief Complaint   Patient presents with    Trigger finger     left ring finger    Finger Pain     left thumb pain        History Obtained From:  patient    HISTORY OF PRESENT ILLNESS:                Marilu Rod is a 82y.o. year old  plan of right index finger pain and left ring and thumb pain. Patient states that his left ring pain has been going on for the last 6 months. Patient's been wearing an extension PIP splint at night to help relieve some of the symptoms. Patient states that the symptoms have progressively gotten worse over the last 6 months. Patient states that the left ring finger gets stuck on occasion in the mornings when he is to forcefully extend his ring finger. He is now also starting to have pain with flexion extension of his right index finger also. Patient is also complaining of base of the left thumb with  and opening jars. Patient does have a brace for the left thumb at this time that provides some relief. Patient does have a history of previous left middle finger trigger that was treated surgically. Patient's not had any injections for his trigger fingers. .  The patient is Right hand dominant. The patients occupation is retired. Past Medical History:    Past Medical History:   Diagnosis Date    Cancer ([facility] [address]     skin    Prostate irregularity      Past Surgical History:    Past Surgical History:   Procedure Laterality Date    HERNIA REPAIR      LEG SURGERY Left 2011    SKIN CANCER EXCISION       Current Medications:   No current facility-administered medications for this visit.    Allergies:  No Known Allergies    Social History:   Social History     Socioeconomic History    Marital status:      Spouse name: Not on file    Number of children: Not on file    Years of education: Not on file    Highest education level: Not on file   Occupational History    Not on file   Social Needs    Financial resource strain: Not on file    Food insecurity     Worry: Not on file     Inability: Not on file    Transportation needs     Medical: Not on file     Non-medical: Not on file   Tobacco Use    Smoking status: Never Smoker    Smokeless tobacco: Never Used   Substance and Sexual Activity    Alcohol use: No    Drug use: No    Sexual activity: Not on file   Lifestyle    Physical activity     Days per week: Not on file     Minutes per session: Not on file    Stress: Not on file   Relationships    Social connections     Talks on phone: Not on file     Gets together: Not on file     Attends Worship service: Not on file     Active member of club or organization: Not on file     Attends meetings of clubs or organizations: Not on file     Relationship status: Not on file    Intimate partner violence     Fear of current or ex partner: Not on file     Emotionally abused: Not on file     Physically abused: Not on file     Forced sexual activity: Not on file   Other Topics Concern    Not on file   Social History Narrative    Not on file     Family History:   No family history on file.     REVIEW OF SYSTEMS:    CONSTITUTIONAL:  negative for  fevers, chills, sweats and fatigue  RESPIRATORY:  negative for  dry cough, cough with sputum, dyspnea, wheezing and chest pain  CARDIOVASCULAR:  negative for  chest pain, dyspnea, palpitations, syncope  GASTROINTESTINAL:  negative for nausea, vomiting, change in bowel habits, diarrhea, constipation and abdominal pain  BEHAVIOR/PSYCH:  negative for poor appetite, increased appetite, decreased sleep and poor concentration  MUSCULOSKELETAL:  positive for  myalgias, arthralgias and pain      PHYSICAL EXAM:    VITALS:  Temp 97.1 °F (36.2 °C) (Infrared)   Resp 22   Ht 5' 10\" (1.778 m)   Wt 180 lb (81.6 kg)   BMI 25.83 kg/m²     CONSTITUTIONAL:  awake, alert, cooperative, no apparent distress, and appears stated age    LUNGS:  No increased work of breathing, good air exchange, clear to auscultation bilaterally, no crackles or wheezing    CARDIOVASCULAR:  Normal apical impulse, regular rate and rhythm, normal injections for his trigger fingers. And S2, no S3 or S4, and no murmur noted    ABDOMEN: Soft, Non-tender to palpation       Bilateral upper extremity    Left:  Positive Tinel's at the wrist, positive Tinel's at the elbow, negative Ana Maria's, positive CMC grind, positive tenderness and crepitus at the ring A1 pulley, neurovascular intact PIN, AIN and ulnar nerve, sensation intact in median, ulnar, superficial radial, negative Finkelstein's, patient has a mild PIP contracture to the middle finger 10 degrees,    Right:  Positive tenderness over the index A1 pulley, positive Tinel's of the wrist, positive Tinel's at the elbow, negative Ana Maria's, neurovascular intact motor to PIN, AIN, ulnar, sensation intact to radial, median, ulnar, negative Finkelstein's, negative CMC grind,      DATA:    Radiology Review: Bilateral hand x-rays 3 views-AP lateral and oblique  Right: No fractures dislocations noted, minimal soft tissue swelling  Left: Demonstrating stage III CMC basal joint arthritis, no fracture dislocations noted, minimal soft tissue swelling    Impression : Left stage III CMC basal joint arthritis, right normal in x-ray    IMPRESSION/RECOMMENDATIONS:      Impression:   Left CMC osteoarthritis, stage III  Left ring trigger finger, intermittent locking  Right index trigger finger, pretrigger    Post diagnosis and treatment options patient date, discussed x-ray imaging with patient today. Patient has left CMC osteoarthritis and left ring and right index trigger fingers. At this time we recommend a cortisone injection for the right index trigger finger, left index trigger finger and the left CMC joint. Patient is agreement with this plan. Would like patient to continue his thumb brace with activities.   We will have patient follow-up in 6 weeks to evaluate improvement with injections. Procedure Note Trigger Finger Injections    The right Index finger and left ring finger was identified as the injection site. The risk and benefits of a cortisone injection were explained and the patient consented to the injection. Under sterile conditions, the digit was injected with a mixture of 1 mL of 1% Lidocaine and 1 mL of 6 mg/mL Betamethasone without complication. A sterile bandage was applied. Procedure Note Wrist Thumb CMC Cortisone Injection    The left wrist thumb CMC joint was identified as the injection site. The risk and benefits of a cortisone injection were explained and the patient consented to the injection. Under sterile conditions, the wrist was injected with a mixture of 1 mL of 1% Lidocaine and 1 mL of 6 mg/mL Betamethasone without complication. A sterile bandage was applied    I have seen and evaluated the patient and agree with the above assessment and plan on today's visit. I have performed the key components of the history and physical examination with significant findings of right index finger trigger, left ring finger trigger, and symptomatic left thumb basal joint arthritis. Injections provided. . I concur with the findings and plan as documented.     Felipe Huntley MD  2/22/2021

## 2021-04-12 ENCOUNTER — OFFICE VISIT (OUTPATIENT)
Dept: ORTHOPEDIC SURGERY | Age: 86
End: 2021-04-12
Payer: MEDICARE

## 2021-04-12 VITALS — RESPIRATION RATE: 18 BRPM | WEIGHT: 182 LBS | BODY MASS INDEX: 26.05 KG/M2 | HEIGHT: 70 IN

## 2021-04-12 DIAGNOSIS — M18.12 PRIMARY OSTEOARTHRITIS OF FIRST CARPOMETACARPAL JOINT OF LEFT HAND: ICD-10-CM

## 2021-04-12 DIAGNOSIS — M65.342 TRIGGER FINGER, LEFT RING FINGER: Primary | ICD-10-CM

## 2021-04-12 PROCEDURE — G8427 DOCREV CUR MEDS BY ELIG CLIN: HCPCS | Performed by: ORTHOPAEDIC SURGERY

## 2021-04-12 PROCEDURE — 1036F TOBACCO NON-USER: CPT | Performed by: ORTHOPAEDIC SURGERY

## 2021-04-12 PROCEDURE — 4040F PNEUMOC VAC/ADMIN/RCVD: CPT | Performed by: ORTHOPAEDIC SURGERY

## 2021-04-12 PROCEDURE — 99214 OFFICE O/P EST MOD 30 MIN: CPT | Performed by: ORTHOPAEDIC SURGERY

## 2021-04-12 PROCEDURE — G8417 CALC BMI ABV UP PARAM F/U: HCPCS | Performed by: ORTHOPAEDIC SURGERY

## 2021-04-12 PROCEDURE — 1123F ACP DISCUSS/DSCN MKR DOCD: CPT | Performed by: ORTHOPAEDIC SURGERY

## 2021-04-12 RX ORDER — ACETAMINOPHEN 500 MG
500 TABLET ORAL EVERY 6 HOURS PRN
Status: ON HOLD | COMMUNITY
End: 2021-05-21 | Stop reason: HOSPADM

## 2021-04-12 NOTE — PATIENT INSTRUCTIONS
Patient Education        Learning About Arthritis at the [facility] of the Thumb  What is it? Arthritis at the base of the thumb joint is wear and tear on the cartilage. Cartilage is a firm, thick, slippery tissue. It covers and protects the ends of bones where they meet to form a joint. When you have arthritis, there are changes in the cartilage that cause it to break down. The bones rub together and cause joint damage and pain. What causes it? Experts don't know what causes arthritis at the base of the thumb. But aging, a lot of use, an injury, or family history may play a part. What are the symptoms? Symptoms of arthritis at the base of the thumb include aching in your joint. Or the pain may feel burning or sharp. You may feel clicking, creaking, or catching in the joint. It may get stiff. You may have more pain and less strength when you pinch or  things. Symptoms may come and go, stay the same, or get worse over time. How is it diagnosed? Your doctor can often diagnose arthritis by asking you questions about your joint pain and other symptoms and examining you. You may also have X-rays and blood tests. Blood tests can help make sure another disease isn't causing your symptoms. How is it treated? Arthritis at the base of your thumb may be treated with rest, pain relievers, steroid medicines, using a brace or splint, andin some casessurgery. To help relieve pain in the joint, rest your sore hand. Switch hands for some activities. You can try heat and cold therapy, such as hot compresses, paraffin wax, cold packs, or ice massage. Your doctor may give you a splint to wear during some activities or when pain flares up. You can often manage mild or moderate arthritis pain with over-the-counter pain relievers. These include medicines that reduce swelling, such as ibuprofen or naproxen. You can also use acetaminophen. Sometimes these medicines are in creams that you can rub on your thumb and hand.  Your doctor may also prescribe other medicine for your pain. For some people, steroid shots may be an option. If none of the treatments work, your doctor may discuss surgery with you. Follow-up care is a key part of your treatment and safety. Be sure to make and go to all appointments, and call your doctor if you are having problems. It's also a good idea to know your test results and keep a list of the medicines you take. Where can you learn more? Go to https://[facility].Diabeto. org and sign in to your [employer] account. Enter T110 in the Linksify box to learn more about \"Learning About Arthritis at the [facility] of the Thumb. \"     If you do not have an account, please click on the \"Sign Up Now\" link. Current as of: August 5, 2020               Content Version: 12.8  © 2006-2021 [employer]. Care instructions adapted under license by [facility] ([facility]). If you have questions about a medical condition or this instruction, always ask your healthcare professional. [place] [address] any warranty or liability for your use of this information. Patient Education        Trigger Finger: Care Instructions  Overview  A trigger finger is a finger stuck in a bent position. It happens when the tendon that bends and straightens the thumb or finger can't slide smoothly under the ligaments that hold the tendon against the bones. In most cases, it's caused by a bump (nodule) that forms on the tendon. The bent finger usually straightens out on its own. A trigger finger can be painful. But it normally isn't a serious problem. Trigger fingers seem to occur more in some groups of people. These groups include:  · People who have diabetes or arthritis. · People who have injured their hands in the past.  · Musicians. · People who  tools often. Rest and exercises may help your finger relax so that it can bend. You may get a corticosteroid shot. This can reduce swelling and pain. liability for your use of this information.

## 2021-04-12 NOTE — PROGRESS NOTES
Department of Orthopedic Surgery/Hand Surgery  History and physical        CHIEF COMPLAINT:    Chief Complaint   Patient presents with    Follow-up     6 week follow up from R index finger     Follow-up     6 week follow up, L thumb CMC injection, L ring finger injections     Trigger finger     L ring finger - would like to discuss other options        History Obtained From:  patient    HISTORY OF PRESENT ILLNESS:                Ember Sampson is a 82y.o. year old  plan of right index finger pain and left ring and thumb pain. Patient states that his left ring pain has been going on for the last 6 months. Patient's been wearing an extension PIP splint at night to help relieve some of the symptoms. Patient states that the symptoms have progressively gotten worse over the last 6 months. Patient states that the left ring finger gets stuck on occasion in the mornings when he is to forcefully extend his ring finger. He is now also starting to have pain with flexion extension of his right index finger also. Patient is also complaining of base of the left thumb with  and opening jars. Patient does have a brace for the left thumb at this time that provides some relief. Patient does have a history of previous left middle finger trigger that was treated surgically. Patient's not had any injections for his trigger fingers. .  The patient is Right hand dominant. The patients occupation is retired. He returns today after receiving injections to the left ring finger and left thumb basal joint. He reports that the ring finger did improve for a few weeks. He reports little improvement in the thumb. However he reports his thumb symptoms are not severe enough to pursue surgery. He is interested in pursuing surgery on the left ring finger. He reports his right index finger has resolved.     Past Medical History:    Past Medical History:   Diagnosis Date    Cancer ([address]     skin    Prostate irregularity GASTROINTESTINAL:  negative for nausea, vomiting, change in bowel habits, diarrhea, constipation and abdominal pain  BEHAVIOR/PSYCH:  negative for poor appetite, increased appetite, decreased sleep and poor concentration  MUSCULOSKELETAL: Continued pain and clicking of left ring finger. Intermittent pain of left thumb basal joint. Resolved symptoms of right index finger      PHYSICAL EXAM:    VITALS:  Resp 18   Ht 5' 9.5\" (1.765 m)   Wt 182 lb (82.6 kg)   BMI 26.49 kg/m²     CONSTITUTIONAL:  awake, alert, cooperative, no apparent distress, and appears stated age    LUNGS:  No increased work of breathing, good air exchange, clear to auscultation bilaterally, no crackles or wheezing    CARDIOVASCULAR:  Normal apical impulse, regular rate and rhythm, normal injections for his trigger fingers. And S2, no S3 or S4, and no murmur noted    ABDOMEN: Soft, Non-tender to palpation       Bilateral upper extremity    Left:  Positive Tinel's at the wrist, positive Tinel's at the elbow, negative Ana Maria's, positive CMC grind, positive tenderness and crepitus at the ring A1 pulley, neurovascular intact PIN, AIN and ulnar nerve, sensation intact in median, ulnar, superficial radial, negative Finkelstein's, patient has a mild PIP contracture to the middle finger 10 degrees. DATA:    Radiology Review: Bilateral hand x-rays 3 views-AP lateral and oblique  Right: No fractures dislocations noted, minimal soft tissue swelling  Left: Demonstrating stage III CMC basal joint arthritis, no fracture dislocations noted, minimal soft tissue swelling      IMPRESSION/RECOMMENDATIONS:      Impression:   Left CMC osteoarthritis, stage III  Left ring trigger finger, intermittent locking  Right index trigger finger, resolved    Plan:    These findings were explained the patient. Treatment options reviewed.   After discussion he like to proceed with a left ring finger trigger release and at the same time a repeat cortisone injection of the left thumb basal joint. He does not wish to pursue surgery on the left thumb basal joint as he reports his symptoms not severe enough. He understands a course of therapy may be necessary. All questions answered. I explained the risks, benefits, alternatives and complications of surgery with the patient including but not limited to the risks of infection, possible damage to nerves, vessels, or tendons, stiffness, loss of range of motion, scar sensitivity, wound healing complications, worsening symptoms, possible need for therapy, as well as the possible need further surgery and unanticipated complications. The patient voiced understanding and all questions were answered. The patient elected to proceed with surgical intervention.      Arie Butler  4/12/2021

## 2021-05-13 ENCOUNTER — PREP FOR PROCEDURE (OUTPATIENT)
Dept: ORTHOPEDIC SURGERY | Age: 86
End: 2021-05-13

## 2021-05-13 RX ORDER — SODIUM CHLORIDE 0.9 % (FLUSH) 0.9 %
5-40 SYRINGE (ML) INJECTION PRN
Status: CANCELLED | OUTPATIENT
Start: 2021-05-13

## 2021-05-13 RX ORDER — SODIUM CHLORIDE 0.9 % (FLUSH) 0.9 %
5-40 SYRINGE (ML) INJECTION EVERY 12 HOURS SCHEDULED
Status: CANCELLED | OUTPATIENT
Start: 2021-05-13

## 2021-05-13 RX ORDER — SODIUM CHLORIDE 9 MG/ML
INJECTION, SOLUTION INTRAVENOUS CONTINUOUS
Status: CANCELLED | OUTPATIENT
Start: 2021-05-13

## 2021-05-13 RX ORDER — SODIUM CHLORIDE 9 MG/ML
25 INJECTION, SOLUTION INTRAVENOUS PRN
Status: CANCELLED | OUTPATIENT
Start: 2021-05-13

## 2021-05-18 RX ORDER — M-VIT,TX,IRON,MINS/CALC/FOLIC 27MG-0.4MG
1 TABLET ORAL DAILY
COMMUNITY

## 2021-05-21 ENCOUNTER — ANESTHESIA EVENT (OUTPATIENT)
Dept: OPERATING ROOM | Age: 86
End: 2021-05-21
Payer: MEDICARE

## 2021-05-21 ENCOUNTER — ANESTHESIA (OUTPATIENT)
Dept: OPERATING ROOM | Age: 86
End: 2021-05-21
Payer: MEDICARE

## 2021-05-21 ENCOUNTER — HOSPITAL ENCOUNTER (OUTPATIENT)
Age: 86
Setting detail: OUTPATIENT SURGERY
Discharge: HOME | End: 2021-05-21
Attending: ORTHOPAEDIC SURGERY | Admitting: ORTHOPAEDIC SURGERY
Payer: MEDICARE

## 2021-05-21 VITALS
WEIGHT: 182 LBS | BODY MASS INDEX: 26.05 KG/M2 | DIASTOLIC BLOOD PRESSURE: 74 MMHG | OXYGEN SATURATION: 95 % | HEART RATE: 52 BPM | HEIGHT: 70 IN | RESPIRATION RATE: 16 BRPM | TEMPERATURE: 98 F | SYSTOLIC BLOOD PRESSURE: 143 MMHG

## 2021-05-21 VITALS — SYSTOLIC BLOOD PRESSURE: 121 MMHG | OXYGEN SATURATION: 99 % | DIASTOLIC BLOOD PRESSURE: 60 MMHG

## 2021-05-21 DIAGNOSIS — M65.342 TRIGGER FINGER, LEFT RING FINGER: Primary | ICD-10-CM

## 2021-05-21 PROCEDURE — 20600 DRAIN/INJ JOINT/BURSA W/O US: CPT | Performed by: ORTHOPAEDIC SURGERY

## 2021-05-21 PROCEDURE — 3600000002 HC SURGERY LEVEL 2 BASE: Performed by: ORTHOPAEDIC SURGERY

## 2021-05-21 PROCEDURE — 2500000003 HC RX 250 WO HCPCS: Performed by: NURSE ANESTHETIST, CERTIFIED REGISTERED

## 2021-05-21 PROCEDURE — 6360000002 HC RX W HCPCS: Performed by: NURSE ANESTHETIST, CERTIFIED REGISTERED

## 2021-05-21 PROCEDURE — 2580000003 HC RX 258: Performed by: ORTHOPAEDIC SURGERY

## 2021-05-21 PROCEDURE — 7100000010 HC PHASE II RECOVERY - FIRST 15 MIN: Performed by: ORTHOPAEDIC SURGERY

## 2021-05-21 PROCEDURE — 2709999900 HC NON-CHARGEABLE SUPPLY: Performed by: ORTHOPAEDIC SURGERY

## 2021-05-21 PROCEDURE — 7100000011 HC PHASE II RECOVERY - ADDTL 15 MIN: Performed by: ORTHOPAEDIC SURGERY

## 2021-05-21 PROCEDURE — 6360000002 HC RX W HCPCS: Performed by: ORTHOPAEDIC SURGERY

## 2021-05-21 PROCEDURE — 3700000000 HC ANESTHESIA ATTENDED CARE: Performed by: ORTHOPAEDIC SURGERY

## 2021-05-21 PROCEDURE — 26121 RELEASE PALM CONTRACTURE: CPT | Performed by: ORTHOPAEDIC SURGERY

## 2021-05-21 PROCEDURE — 3700000001 HC ADD 15 MINUTES (ANESTHESIA): Performed by: ORTHOPAEDIC SURGERY

## 2021-05-21 PROCEDURE — 2500000003 HC RX 250 WO HCPCS: Performed by: ORTHOPAEDIC SURGERY

## 2021-05-21 PROCEDURE — 3600000012 HC SURGERY LEVEL 2 ADDTL 15MIN: Performed by: ORTHOPAEDIC SURGERY

## 2021-05-21 RX ORDER — SODIUM CHLORIDE 0.9 % (FLUSH) 0.9 %
5-40 SYRINGE (ML) INJECTION EVERY 12 HOURS SCHEDULED
Status: DISCONTINUED | OUTPATIENT
Start: 2021-05-21 | End: 2021-05-21 | Stop reason: HOSPADM

## 2021-05-21 RX ORDER — HYDROCODONE BITARTRATE AND ACETAMINOPHEN 5; 325 MG/1; MG/1
1 TABLET ORAL EVERY 6 HOURS PRN
Qty: 12 TABLET | Refills: 0 | Status: SHIPPED | OUTPATIENT
Start: 2021-05-21 | End: 2021-05-24

## 2021-05-21 RX ORDER — SODIUM CHLORIDE 0.9 % (FLUSH) 0.9 %
5-40 SYRINGE (ML) INJECTION PRN
Status: DISCONTINUED | OUTPATIENT
Start: 2021-05-21 | End: 2021-05-21 | Stop reason: HOSPADM

## 2021-05-21 RX ORDER — DEXAMETHASONE SODIUM PHOSPHATE 4 MG/ML
INJECTION, SOLUTION INTRA-ARTICULAR; INTRALESIONAL; INTRAMUSCULAR; INTRAVENOUS; SOFT TISSUE PRN
Status: DISCONTINUED | OUTPATIENT
Start: 2021-05-21 | End: 2021-05-21 | Stop reason: SDUPTHER

## 2021-05-21 RX ORDER — PROPOFOL 10 MG/ML
INJECTION, EMULSION INTRAVENOUS PRN
Status: DISCONTINUED | OUTPATIENT
Start: 2021-05-21 | End: 2021-05-21 | Stop reason: SDUPTHER

## 2021-05-21 RX ORDER — PROPOFOL 10 MG/ML
INJECTION, EMULSION INTRAVENOUS CONTINUOUS PRN
Status: DISCONTINUED | OUTPATIENT
Start: 2021-05-21 | End: 2021-05-21 | Stop reason: SDUPTHER

## 2021-05-21 RX ORDER — SODIUM CHLORIDE 9 MG/ML
25 INJECTION, SOLUTION INTRAVENOUS PRN
Status: DISCONTINUED | OUTPATIENT
Start: 2021-05-21 | End: 2021-05-21 | Stop reason: HOSPADM

## 2021-05-21 RX ORDER — FENTANYL CITRATE 50 UG/ML
INJECTION, SOLUTION INTRAMUSCULAR; INTRAVENOUS PRN
Status: DISCONTINUED | OUTPATIENT
Start: 2021-05-21 | End: 2021-05-21 | Stop reason: SDUPTHER

## 2021-05-21 RX ORDER — LIDOCAINE HYDROCHLORIDE 10 MG/ML
INJECTION, SOLUTION INFILTRATION; PERINEURAL PRN
Status: DISCONTINUED | OUTPATIENT
Start: 2021-05-21 | End: 2021-05-21 | Stop reason: ALTCHOICE

## 2021-05-21 RX ORDER — ONDANSETRON 2 MG/ML
INJECTION INTRAMUSCULAR; INTRAVENOUS PRN
Status: DISCONTINUED | OUTPATIENT
Start: 2021-05-21 | End: 2021-05-21 | Stop reason: SDUPTHER

## 2021-05-21 RX ORDER — LIDOCAINE HYDROCHLORIDE 20 MG/ML
INJECTION, SOLUTION EPIDURAL; INFILTRATION; INTRACAUDAL; PERINEURAL PRN
Status: DISCONTINUED | OUTPATIENT
Start: 2021-05-21 | End: 2021-05-21 | Stop reason: SDUPTHER

## 2021-05-21 RX ORDER — SODIUM CHLORIDE 9 MG/ML
INJECTION, SOLUTION INTRAVENOUS CONTINUOUS
Status: DISCONTINUED | OUTPATIENT
Start: 2021-05-21 | End: 2021-05-21 | Stop reason: HOSPADM

## 2021-05-21 RX ADMIN — ONDANSETRON 4 MG: 2 INJECTION INTRAMUSCULAR; INTRAVENOUS at 09:15

## 2021-05-21 RX ADMIN — DEXAMETHASONE SODIUM PHOSPHATE 10 MG: 4 INJECTION, SOLUTION INTRAMUSCULAR; INTRAVENOUS at 09:15

## 2021-05-21 RX ADMIN — LIDOCAINE HYDROCHLORIDE 100 MG: 20 INJECTION, SOLUTION EPIDURAL; INFILTRATION; INTRACAUDAL; PERINEURAL at 09:13

## 2021-05-21 RX ADMIN — SODIUM CHLORIDE: 9 INJECTION, SOLUTION INTRAVENOUS at 09:10

## 2021-05-21 RX ADMIN — Medication 2000 MG: at 09:15

## 2021-05-21 RX ADMIN — FENTANYL CITRATE 50 MCG: 50 INJECTION, SOLUTION INTRAMUSCULAR; INTRAVENOUS at 09:13

## 2021-05-21 RX ADMIN — PROPOFOL 100 MG: 10 INJECTION, EMULSION INTRAVENOUS at 09:13

## 2021-05-21 RX ADMIN — PROPOFOL 50 MCG/KG/MIN: 10 INJECTION, EMULSION INTRAVENOUS at 09:13

## 2021-05-21 ASSESSMENT — PULMONARY FUNCTION TESTS
PIF_VALUE: 1
PIF_VALUE: 0
PIF_VALUE: 0
PIF_VALUE: 1

## 2021-05-21 ASSESSMENT — LIFESTYLE VARIABLES: SMOKING_STATUS: 0

## 2021-05-21 ASSESSMENT — PAIN - FUNCTIONAL ASSESSMENT: PAIN_FUNCTIONAL_ASSESSMENT: 0-10

## 2021-05-21 NOTE — BRIEF OP NOTE
Brief Postoperative Note      Patient: Rufino Chong  YOB: 1935  MRN: [id number]    Date of Procedure: 5/21/2021    Pre-Op Diagnosis: LEFT RING FINGER TRIGGER LEFT THUMB CARPOMETACARPAL ARTHRITIS    Post-Op Diagnosis: Same       Procedure(s):  LEFT RING FINGER TRIGGER RELEASE LEFT THUMB CARPOMETACARPAL CORTISONE INJECTION    Surgeon(s):  Anshul Mckeon MD    Assistant:  Resident:  Ariana Harris DO    Anesthesia: Monitor Anesthesia Care    Estimated Blood Loss (mL): Minimal    Complications: None    Specimens:   * No specimens in log *    Implants:  * No implants in log *      Drains: * No LDAs found *    Findings: see op note    Electronically signed by Ariana Harris DO on 5/21/2021 at 9:14 AM

## 2021-05-21 NOTE — ANESTHESIA POSTPROCEDURE EVALUATION
Department of Anesthesiology  Postprocedure Note    Patient: Erum Srivastava  MRN: [id number]  YOB: 1935  Date of evaluation: 5/21/2021  Time:  1:35 PM     Procedure Summary     Date: 05/21/21 Room / Location: [facility] OR 02 / [facility]    Anesthesia Start: 4815 Anesthesia Stop: 8482    Procedure: LEFT RING FINGER TRIGGER RELEASE LEFT THUMB CARPOMETACARPAL CORTISONE INJECTION (Left Fingers) Diagnosis: (LEFT RING FINGER TRIGGER LEFT THUMB CARPOMETACARPAL ARTHRITIS)    Surgeons: Harjit Waterman MD Responsible Provider: Vannesa Hernandez MD    Anesthesia Type: MAC ASA Status: 3          Anesthesia Type: MAC    Ry Phase I: Ry Score: 10    Ry Phase II: Ry Score: 10    Last vitals: Reviewed and per EMR flowsheets.        Anesthesia Post Evaluation    Patient location during evaluation: PACU  Patient participation: complete - patient participated  Level of consciousness: awake and alert  Airway patency: patent  Nausea & Vomiting: no vomiting and no nausea  Complications: no  Cardiovascular status: blood pressure returned to baseline  Respiratory status: acceptable  Hydration status: euvolemic

## 2021-05-21 NOTE — OP NOTE
Operative Note      Patient: Bernardino Thomas  YOB: 1935  MRN: [id number]    Date of Procedure: 5/21/2021    Pre-Op Diagnosis: LEFT RING FINGER TRIGGER LEFT THUMB CARPOMETACARPAL ARTHRITIS    Post-Op Diagnosis: Same       Procedure(s):  LEFT RING FINGER TRIGGER RELEASE LEFT THUMB CARPOMETACARPAL CORTISONE INJECTION    Surgeon(s):  Helena Becerra MD    Assistant:   Resident: Howard Dunn DO    Anesthesia: Monitor Anesthesia Care    Estimated Blood Loss (mL): Minimal    Complications: None    Specimens:   * No specimens in log *    Implants:  * No implants in log *      Drains: * No LDAs found *    Findings: see details    Detailed Description of Procedure:   OPERATIVE REPORT       DATE OF PROCEDURE:   5/21/2021     PREOPERATIVE DIAGNOSIS:  1. Left ring finger trigger. 2. Left thumb arthritis       POSTOPERATIVE DIAGNOSES:  1. Left ring finger trigger. 2. Left palm dupuytrens disease  3. Left thumb arthritis       PROCEDURE PERFORMED:  1. Left ring finger trigger release. 2. Limited left palm fasciectomy  3. Left thumb cortisone injection     ANESTHESIA:  1. Monitored anesthesia care. 2.  Local anesthetic by surgeon consisting approximately 10 mL of 1%  lidocaine plain.     ASSISTANT:  Dr Allen Mcginnis, orthopedic surgery resident.     FINDINGS:  1.  Evidence of significant stenosis of the flexor tendons  beneath the A1 pulley that was adequately decompressed with release of the  A1 pulley. 2.  Status post A1 pulley release, full intraoperative PIP range of motion  Restored. 3.  Dupuytren's cord extending into the distal palm. This is excised through a limited Kat incision over the A1 pulley of the ring finger     COMPLICATIONS:  None.     SPECIMENS:  None.     DISPOSITION:  The patient was stable throughout the procedure.     OPERATIVE INDICATION:  The patient is a very patient  with persistent recalcitrant finger triggering.   The risks, benefits, alternatives, and  complications of surgery were explained to him including but not limited to  the risks of infection; damage to nerves, vessels, and tendons; failure to  improve his symptoms; worsening of symptoms; recurrence of symptoms,  persistent symptoms; scar sensitivity. The Patient understood and wished to  proceed.      SURGERY IN DETAIL:  The patient was identified in the holding area. The  left finger was identified as the surgical site. Risks, benefits,  alternatives, and complications were again reviewed. All questions  answered and the patient wished to proceed. The patient was then seen by Anesthesia, taken  to the operating room, placed supine on the table, and underwent monitored  anesthesia care per Anesthesia Department. Under sterile conditions, 10 mL  of 1% lidocaine were infiltrated over the surgical site of the middle  finger.       The left upper extremity was prepared and draped in standard sterile  fashion. The arm was exsanguinated. Tourniquet inflated to 250 mmHg. Total tourniquet time was approximately 10 minutes.     An oblique incision over the A1 pulley of the ring finger was then  created followed by blunt dissection, identification, preservation of  radial and ulnar neurovascular bundles. There was thickened fascia consistent with Dupuytren's disease overlying the surgical site. There is limited exposure of the neurovascular bundles. Therefore the oblique incision was extended proximally in a short Brunner type fashion. Flap was elevated. The diseased fascia was excised from the zone of surgical dissection. This allowed clear visualization of the neurovascular structures which were protected. With these protected, the A1  pulley was incised and extended proximally to include the distal palmar  fascia. The A1 pulley incised longitudenally. The release was then extended distally to include the proximal extent of the A2 pulley and proximally to include the distal palmar fascia.  There was evidence of stenosis

## 2021-05-21 NOTE — ANESTHESIA PRE PROCEDURE
Department of Anesthesiology  Preprocedure Note       Name:  Margaret Muller   Age:  82 y.o.  :  1935                                          MRN:  [id number]         Date:  2021      Surgeon: Salas Romero):  Jean Jhaveri MD    Procedure: Procedure(s):  LEFT RING FINGER TRIGGER RELEASE LEFT THUMB CARPOMETACARPAL CORTISONE INJECTION    Medications prior to admission:   Prior to Admission medications    Medication Sig Start Date End Date Taking?  Authorizing Provider   Multiple Vitamins-Minerals (THERAPEUTIC MULTIVITAMIN-MINERALS) tablet Take 1 tablet by mouth daily   Yes Historical Provider, MD   acetaminophen (TYLENOL) 500 MG tablet Take 500 mg by mouth every 6 hours as needed for Pain   Yes Historical Provider, MD   ibuprofen (ADVIL;MOTRIN) 200 MG tablet Take 400 mg by mouth every 6 hours as needed for Pain   Yes Historical Provider, MD   Cholecalciferol (VITAMIN D) 2000 units CAPS capsule Take 1 capsule by mouth daily   Yes Historical Provider, MD   doxazosin (CARDURA) 4 MG tablet Take 4 mg by mouth nightly   Yes Historical Provider, MD   esomeprazole Magnesium (NEXIUM) 20 MG PACK Take 20 mg by mouth daily   Yes Historical Provider, MD       Current medications:    Current Facility-Administered Medications   Medication Dose Route Frequency Provider Last Rate Last Admin    0.9 % sodium chloride infusion   Intravenous Continuous Jean Jhaveri MD        0.9 % sodium chloride infusion  25 mL Intravenous PRN Jean Jhaveri MD        ceFAZolin (ANCEF) 2000 mg in sterile water 20 mL IV syringe  2,000 mg Intravenous On Call to Fermín Gómez MD        sodium chloride flush 0.9 % injection 5-40 mL  5-40 mL Intravenous 2 times per day Jean Jhaveri MD        sodium chloride flush 0.9 % injection 5-40 mL  5-40 mL Intravenous PRN Jean Jhaveri MD           Allergies:  No Known Allergies    Problem List:    Patient Active Problem List   Diagnosis Code    Trigger finger, left ring finger M65.342 HCG, HCGQUANT     ABGs: No results found for: PHART, PO2ART, CAO8JTN, OQJ8JBJ, BEART, F1YFFXDI     Type & Screen (If Applicable):  No results found for: LABABO, LABRH    Drug/Infectious Status (If Applicable):  No results found for: HIV, HEPCAB    COVID-19 Screening (If Applicable): No results found for: COVID19        Anesthesia Evaluation  Patient summary reviewed and Nursing notes reviewed no history of anesthetic complications:   Airway: Mallampati: III  TM distance: >3 FB   Neck ROM: full  Mouth opening: > = 3 FB Dental: normal exam         Pulmonary:Negative Pulmonary ROS breath sounds clear to auscultation      (-) not a current smoker                           Cardiovascular:  Exercise tolerance: good (>4 METS),           Rhythm: regular  Rate: normal           Beta Blocker:  Not on Beta Blocker         Neuro/Psych:   (+) neuromuscular disease:,              ROS comment: NECK PAIN GI/Hepatic/Renal:   (+) GERD: well controlled,           Endo/Other:    (+) : arthritis: OA., malignancy/cancer. Abdominal:           Vascular: negative vascular ROS. Anesthesia Plan      MAC     ASA 3       Induction: intravenous. Anesthetic plan and risks discussed with patient and spouse.                       Heidy Celeste MD   5/21/2021

## 2021-05-21 NOTE — H&P
Department of Orthopedic Surgery/Hand Surgery  History and physical        CHIEF COMPLAINT:    No chief complaint on file. History Obtained From:  patient    HISTORY OF PRESENT ILLNESS:                Garnet Sacks is a 82y.o. year old  plan of right index finger pain and left ring and thumb pain. Patient states that his left ring pain has been going on for the last 6 months. Patient's been wearing an extension PIP splint at night to help relieve some of the symptoms. Patient states that the symptoms have progressively gotten worse over the last 6 months. Patient states that the left ring finger gets stuck on occasion in the mornings when he is to forcefully extend his ring finger. He is now also starting to have pain with flexion extension of his right index finger also. Patient is also complaining of base of the left thumb with  and opening jars. Patient does have a brace for the left thumb at this time that provides some relief. Patient does have a history of previous left middle finger trigger that was treated surgically. Patient's not had any injections for his trigger fingers. .  The patient is Right hand dominant. The patients occupation is retired. He returns today after receiving injections to the left ring finger and left thumb basal joint. He reports that the ring finger did improve for a few weeks. He reports little improvement in the thumb. However he reports his thumb symptoms are not severe enough to pursue surgery. He is interested in pursuing surgery on the left ring finger. He reports his right index finger has resolved.     Past Medical History:    Past Medical History:   Diagnosis Date    Arthritis     right thumb    Oscar esophagus     Cancer (HCC)     skin    Prostate irregularity     Trigger finger, left ring finger      Past Surgical History:    Past Surgical History:   Procedure Laterality Date    ANKLE FUSION Left     HERNIA REPAIR      LEG SURGERY Left 2011    tib fib fx    SKIN CANCER EXCISION       Current Medications:   No current facility-administered medications for this encounter. Allergies:  No Known Allergies    Social History:   Social History     Socioeconomic History    Marital status:      Spouse name: Not on file    Number of children: Not on file    Years of education: Not on file    Highest education level: Not on file   Occupational History    Not on file   Tobacco Use    Smoking status: Never Smoker    Smokeless tobacco: Never Used   Vaping Use    Vaping Use: Never used   Substance and Sexual Activity    Alcohol use: No    Drug use: No    Sexual activity: Not on file   Other Topics Concern    Not on file   Social History Narrative    Not on file     Social Determinants of Health     Financial Resource Strain:     Difficulty of Paying Living Expenses:    Food Insecurity:     Worried About Running Out of Food in the Last Year:     [address] in the Last Year:    Transportation Needs:     Lack of Transportation (Medical):  Lack of Transportation (Non-Medical):    Physical Activity:     Days of Exercise per Week:     Minutes of Exercise per Session:    Stress:     Feeling of Stress :    Social Connections:     Frequency of Communication with Friends and Family:     Frequency of Social Gatherings with Friends and Family:     Attends Jainism Services:     Active Member of Clubs or Organizations:     Attends Club or Organization Meetings:     Marital Status:    Intimate Partner Violence:     Fear of Current or Ex-Partner:     Emotionally Abused:     Physically Abused:     Sexually Abused:      Family History:   History reviewed. No pertinent family history.     REVIEW OF SYSTEMS:    CONSTITUTIONAL:  negative for  fevers, chills, sweats and fatigue  RESPIRATORY:  negative for  dry cough, cough with sputum, dyspnea, wheezing and chest pain  CARDIOVASCULAR:  negative for  chest pain, dyspnea, palpitations, syncope  GASTROINTESTINAL:  negative for nausea, vomiting, change in bowel habits, diarrhea, constipation and abdominal pain  BEHAVIOR/PSYCH:  negative for poor appetite, increased appetite, decreased sleep and poor concentration  MUSCULOSKELETAL: Continued pain and clicking of left ring finger. Intermittent pain of left thumb basal joint. Resolved symptoms of right index finger      PHYSICAL EXAM:    VITALS:  Ht 5' 9.5\" (1.765 m)   Wt 182 lb (82.6 kg)   BMI 26.49 kg/m²     CONSTITUTIONAL:  awake, alert, cooperative, no apparent distress, and appears stated age    LUNGS:  No increased work of breathing, good air exchange, clear to auscultation bilaterally, no crackles or wheezing    CARDIOVASCULAR:  Normal apical impulse, regular rate and rhythm, normal injections for his trigger fingers. And S2, no S3 or S4, and no murmur noted    ABDOMEN: Soft, Non-tender to palpation       Bilateral upper extremity    Left:  Positive Tinel's at the wrist, positive Tinel's at the elbow, negative Ana Maria's, positive CMC grind, positive tenderness and crepitus at the ring A1 pulley, neurovascular intact PIN, AIN and ulnar nerve, sensation intact in median, ulnar, superficial radial, negative Finkelstein's, patient has a mild PIP contracture to the middle finger 10 degrees. DATA:    Radiology Review: Bilateral hand x-rays 3 views-AP lateral and oblique  Right: No fractures dislocations noted, minimal soft tissue swelling  Left: Demonstrating stage III CMC basal joint arthritis, no fracture dislocations noted, minimal soft tissue swelling      IMPRESSION/RECOMMENDATIONS:      Impression:   Left CMC osteoarthritis, stage III  Left ring trigger finger, intermittent locking  Right index trigger finger, resolved    Plan:    These findings were explained the patient. Treatment options reviewed.   After discussion he like to proceed with a left ring finger trigger release and at the same time a repeat cortisone injection of the left thumb basal joint. He does not wish to pursue surgery on the left thumb basal joint as he reports his symptoms not severe enough. He understands a course of therapy may be necessary. All questions answered. I explained the risks, benefits, alternatives and complications of surgery with the patient including but not limited to the risks of infection, possible damage to nerves, vessels, or tendons, stiffness, loss of range of motion, scar sensitivity, wound healing complications, worsening symptoms, possible need for therapy, as well as the possible need further surgery and unanticipated complications. The patient voiced understanding and all questions were answered. The patient elected to proceed with surgical intervention. The patient was counseled at length about the risks of norah Covid-19 during their perioperative period and any recovery window from their procedure. The patient was made aware that norah Covid-19  may worsen their prognosis for recovering from their procedure  and lend to a higher morbidity and/or mortality risk. All material risks, benefits, and reasonable alternatives including postponing the procedure were discussed. The patient does wish to proceed with the procedure at this time. History and Physical Update     Patient was seen and examined. Patient's history and physical was reviewed with the patient. There has been no significant interval changes.       Electronically signed by Toby Segovia DO on 5/21/2021 at 6:36 AM

## 2021-06-01 ENCOUNTER — NURSE ONLY (OUTPATIENT)
Dept: ORTHOPEDIC SURGERY | Age: 86
End: 2021-06-01

## 2021-06-01 ENCOUNTER — TREATMENT (OUTPATIENT)
Dept: OCCUPATIONAL THERAPY | Age: 86
End: 2021-06-01
Payer: MEDICARE

## 2021-06-01 DIAGNOSIS — M18.12 PRIMARY OSTEOARTHRITIS OF FIRST CARPOMETACARPAL JOINT OF LEFT HAND: Primary | ICD-10-CM

## 2021-06-01 DIAGNOSIS — M19.042 PRIMARY OSTEOARTHRITIS OF LEFT HAND: Primary | ICD-10-CM

## 2021-06-01 PROCEDURE — 97530 THERAPEUTIC ACTIVITIES: CPT | Performed by: OCCUPATIONAL THERAPIST

## 2021-06-01 PROCEDURE — 97760 ORTHOTIC MGMT&TRAING 1ST ENC: CPT | Performed by: OCCUPATIONAL THERAPIST

## 2021-06-01 NOTE — PROGRESS NOTES
11 days postop left ring finger trigger release, left thumb CMC cortisone injection. Over all he is doing well. Exam: Incision healing nice. No signs of infection. Sutures removed. Patient was educated on finger exercises, scar management. He will follow up this week with Dr. Gerardo Porras.

## 2021-06-04 ENCOUNTER — OFFICE VISIT (OUTPATIENT)
Dept: ORTHOPEDIC SURGERY | Age: 86
End: 2021-06-04

## 2021-06-04 VITALS — RESPIRATION RATE: 18 BRPM | WEIGHT: 185 LBS | HEIGHT: 69 IN | BODY MASS INDEX: 27.4 KG/M2

## 2021-06-04 DIAGNOSIS — M18.12 PRIMARY OSTEOARTHRITIS OF FIRST CARPOMETACARPAL JOINT OF LEFT HAND: Primary | ICD-10-CM

## 2021-06-04 PROCEDURE — 99024 POSTOP FOLLOW-UP VISIT: CPT | Performed by: ORTHOPAEDIC SURGERY

## 2021-06-04 NOTE — PROGRESS NOTES
Doing well status post trigger release. He is pleased with the results. Reports no significant improvement with the cortisone injection for his basal joint. His exam: Incision well-healed. No signs of infection. Positive swelling and moderate tenderness. No triggering. Full range of motion of the digit. Assessment: 2 weeks postop trigger release and left thumb basal joint cortisone injection    Plan    Patient has attended therapy. He is working on range of motion. Patient is advised on scar management and scar massage. Home exercises again reviewed. Regards to his thumb he already has a brace. He may follow-up if he wants to have another injection. Briefly discussed surgical options which she does not wish to pursue.

## 2021-10-21 ENCOUNTER — OFFICE VISIT (OUTPATIENT)
Dept: ORTHOPEDIC SURGERY | Age: 86
End: 2021-10-21
Payer: MEDICARE

## 2021-10-21 VITALS — BODY MASS INDEX: 27.92 KG/M2 | RESPIRATION RATE: 18 BRPM | HEIGHT: 70 IN | WEIGHT: 195 LBS

## 2021-10-21 DIAGNOSIS — M24.542 CONTRACTURE OF JOINT OF FINGER, LEFT: Primary | ICD-10-CM

## 2021-10-21 DIAGNOSIS — L90.5 SCAR CONTRACTURE: ICD-10-CM

## 2021-10-21 DIAGNOSIS — M20.091 CONTRACTURE OF RIGHT INDEX FINGER: ICD-10-CM

## 2021-10-21 PROCEDURE — 1036F TOBACCO NON-USER: CPT | Performed by: ORTHOPAEDIC SURGERY

## 2021-10-21 PROCEDURE — 99213 OFFICE O/P EST LOW 20 MIN: CPT | Performed by: ORTHOPAEDIC SURGERY

## 2021-10-21 PROCEDURE — G8484 FLU IMMUNIZE NO ADMIN: HCPCS | Performed by: ORTHOPAEDIC SURGERY

## 2021-10-21 PROCEDURE — 1123F ACP DISCUSS/DSCN MKR DOCD: CPT | Performed by: ORTHOPAEDIC SURGERY

## 2021-10-21 PROCEDURE — 20600 DRAIN/INJ JOINT/BURSA W/O US: CPT | Performed by: ORTHOPAEDIC SURGERY

## 2021-10-21 PROCEDURE — G8427 DOCREV CUR MEDS BY ELIG CLIN: HCPCS | Performed by: ORTHOPAEDIC SURGERY

## 2021-10-21 PROCEDURE — G8417 CALC BMI ABV UP PARAM F/U: HCPCS | Performed by: ORTHOPAEDIC SURGERY

## 2021-10-21 PROCEDURE — 4040F PNEUMOC VAC/ADMIN/RCVD: CPT | Performed by: ORTHOPAEDIC SURGERY

## 2021-10-21 RX ORDER — BETAMETHASONE SODIUM PHOSPHATE AND BETAMETHASONE ACETATE 3; 3 MG/ML; MG/ML
6 INJECTION, SUSPENSION INTRA-ARTICULAR; INTRALESIONAL; INTRAMUSCULAR; SOFT TISSUE ONCE
Status: COMPLETED | OUTPATIENT
Start: 2021-10-21 | End: 2021-10-21

## 2021-10-21 RX ADMIN — BETAMETHASONE SODIUM PHOSPHATE AND BETAMETHASONE ACETATE 6 MG: 3; 3 INJECTION, SUSPENSION INTRA-ARTICULAR; INTRALESIONAL; INTRAMUSCULAR; SOFT TISSUE at 16:45

## 2021-10-21 NOTE — PROGRESS NOTES
Chief Complaint   Patient presents with    Follow Up After Procedure     Lt ring finger stiffness, trigger release on 5/21         Albertina Gold is a 82y.o. year old  who presents for follow up of left ring finger trigger finger release. He is roughly 5 months from surgery. He states over the past 2 months he has noticed some flexing of his middle finger. He states that this has progressively gotten worse and is interfering with his activities now. He states he has not done anything for this. He is also complaining of a contracture of his right index finger. He does not have any significant pain.       Past Medical History:   Diagnosis Date    Arthritis     right thumb    Oscar esophagus     Cancer (HCC)     skin    Prostate irregularity     Trigger finger, left ring finger      Past Surgical History:   Procedure Laterality Date    ANKLE FUSION Left     FINGER TRIGGER RELEASE Left 5/21/2021    LEFT RING FINGER TRIGGER RELEASE LEFT THUMB CARPOMETACARPAL CORTISONE INJECTION performed by Radha Browne MD at [address] [facility] [address] Left 2011    tib fib fx    SKIN CANCER EXCISION         Current Outpatient Medications:     Multiple Vitamins-Minerals (THERAPEUTIC MULTIVITAMIN-MINERALS) tablet, Take 1 tablet by mouth daily, Disp: , Rfl:     ibuprofen (ADVIL;MOTRIN) 200 MG tablet, Take 400 mg by mouth every 6 hours as needed for Pain, Disp: , Rfl:     Cholecalciferol (VITAMIN D) 2000 units CAPS capsule, Take 1 capsule by mouth daily, Disp: , Rfl:     doxazosin (CARDURA) 4 MG tablet, Take 4 mg by mouth nightly, Disp: , Rfl:     esomeprazole Magnesium (NEXIUM) 20 MG PACK, Take 20 mg by mouth daily, Disp: , Rfl:   No Known Allergies  Social History     Socioeconomic History    Marital status:      Spouse name: Not on file    Number of children: Not on file    Years of education: Not on file    Highest education level: Not on file   Occupational History    Not on file Tobacco Use    Smoking status: Never Smoker    Smokeless tobacco: Never Used   Vaping Use    Vaping Use: Never used   Substance and Sexual Activity    Alcohol use: No    Drug use: No    Sexual activity: Not on file   Other Topics Concern    Not on file   Social History Narrative    Not on file     Social Determinants of Health     Financial Resource Strain:     Difficulty of Paying Living Expenses:    Food Insecurity:     Worried About Running Out of Food in the Last Year:     [address] in the Last Year:    Transportation Needs:     Lack of Transportation (Medical):  Lack of Transportation (Non-Medical):    Physical Activity:     Days of Exercise per Week:     Minutes of Exercise per Session:    Stress:     Feeling of Stress :    Social Connections:     Frequency of Communication with Friends and Family:     Frequency of Social Gatherings with Friends and Family:     Attends Religion Services:     Active Member of Clubs or Organizations:     Attends Club or Organization Meetings:     Marital Status:    Intimate Partner Violence:     Fear of Current or Ex-Partner:     Emotionally Abused:     Physically Abused:     Sexually Abused:      No family history on file. Skin: (-) rash,(-) psoriasis,(-) eczema, (-)skin cancer. Musculoskeletal: (-) fractures,  (-) dislocations,(-) collagen vascular disease, (-) fibromyalgia, (-) multiple sclerosis, (-) muscular dystrophy, (-) RSD,(-) joint pain (-)swelling, (-) joint pain,swelling. Neurologic: (-) epilepsy, (-)seizures,(-) brain tumor,(-) TIA, (-)stroke, (-)headaches, (-)Parkinson disease,(-) memory loss, (-) LOC. Cardiovascular: (-) Chest pain, (-) swelling in legs/feet, (-) SOB, (-) cramping in legs/feet with walking. SUBJECTIVE:      Constitutional:    The patient is alert and oriented x 3, appears to be stated age and in no distress.     Left upper extremity  Skin is intact circumferentially scar is well-healed over the volar portion of the ring finger. He has a roughly 20 degree flexion contracture at the MCP joint. Able to flex and extend at the DIP, PIP, MCP joint. No tenderness to palpation over the A1 pulley. Sensations intact light touch is in the radial and ulnar digital nerves to the ring finger. Remainder of the hand is neurovascularly intact. Full flexion extension of all the other digits. Right upper extremity  Skin is intact circumferentially well-healed scar over the ring finger no signs of infection. No tenderness to palpation over any of the A1 pulleys. He does have a contracture of the PIP joint of the index finger roughly 20 degrees in nature. Sensations intact light touch in the radial, ulnar, median nerve distribution of the hand. Full flexion extension remainder of the digits. 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 Flexion contracture of the PIP joint of the right index finger  Oscar's esophagus      Plan: Discussed with patient that his contracture is likely due to scar tissue. Recommended he begin formal occupational therapy to the hand to work on range of motion and stretching to the finger. Patient given steroid injection over the A1 pulley to help break up scar tissue. Also provided LMB splint for the index finger. he is in agreement would like to proceed. Follow-up as needed    Procedure Note Trigger Finger Injection    The left Ring finger MCP joint was identified as the injection site. The risk and benefits of a cortisone injection were explained and the patient consented to the injection. Under sterile conditions, the digit was injected with a mixture of 1 mL of 1% Lidocaine and 1 mL of 6 mg/mL Betamethasone without complication. A sterile bandage was applied. I have seen and evaluated the patient and agree with the above assessment and plan on today's visit.  I have performed the key components of the history and physical examination with significant findings of right index finger contracture. PIP joint LMB splint applied to index finger. Patient referred to therapy for right index finger and the left ring finger scar contracture. Injection patient excepted an injection along the ring finger scar over his surgical site. Therapy for dynamic/static progressive splinting as needed to improve MCP joint extension. He does have a 45 degree contracture across the MCP joint. I concur with the findings and plan as documented.     Brissa Fair MD  10/21/2021

## 2021-10-28 ENCOUNTER — EVALUATION (OUTPATIENT)
Dept: OCCUPATIONAL THERAPY | Age: 86
End: 2021-10-28
Payer: MEDICARE

## 2021-10-28 DIAGNOSIS — M20.091 CONTRACTURE OF RIGHT INDEX FINGER: ICD-10-CM

## 2021-10-28 DIAGNOSIS — M24.542 CONTRACTURE OF JOINT OF LEFT HAND: Primary | ICD-10-CM

## 2021-10-28 PROCEDURE — 97760 ORTHOTIC MGMT&TRAING 1ST ENC: CPT | Performed by: OCCUPATIONAL THERAPIST

## 2021-10-28 PROCEDURE — 97165 OT EVAL LOW COMPLEX 30 MIN: CPT | Performed by: OCCUPATIONAL THERAPIST

## 2021-10-28 NOTE — PROGRESS NOTES
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 (ICD-10-CM) - Contracture of right index finger            Date of Surgery/Injury: LRF trigger finger release (May 7812)    Insurance/Certification information: Medicare  Plan of care signed (Y/N): N  Visit# / total visits:     Referring Practitioner:  Dr Donald Deng  Specific Practitioner Orders: Static/dynamic progressive splinting for Rt index and Lt ring finger, stretching, modalities PRN   Frequency 1x week for 6 weeks    Assessment of current deficits   [] Functional mobility   [x] ADLs  [] Strength   [] Cognition   [] Functional transfers   [] IADLs  [] Safety Awareness  [] Endurance   [] Fine Motor Coordination  [] Balance  [] Vision/perception  [] Sensation    [] Gross Motor Coordination [x] ROM  [x] Pain   [] Edema    [x] Scar Adhesion/Skin Integrity     OT PLAN OF CARE   OT POC based on physician orders, patient diagnosis and results of clinical assessment    Frequency/Duration: 1x/ week x 6 weeks  Specific OT Treatment to include:     [x] Instruction in HEP                   Modalities:  [x] Therapeutic Exercise        [x] Ultrasound               [] Electrical Stimulation/Attended  [x] PROM/Stretching                    [] Fluidotherapy          [x]  Paraffin                   [] AAROM  [x] AROM                 [] Iontophoresis:   [] Tendon Glides                                               [] Neuromuscular Re-Ed            [] ADL/IADL re-training    [x] Therapeutic Activity       [x] Pain Management with/without modalities PRN                 [x] Manual Therapy                      [x] Splinting                      [x] Scar Management                   []Joint Protection/Training  []Ergonomics                             [] Joint Mobilization        [] Adaptive Equipment Assessment/Training                             [] Manual Edema Mobilization   [] Myofascial Release                 [] Energy Conservation/Work Simplification  [] GM/FM Coordination       [] Safety retraining/education per  individual diagnosis/goals  [] Desensitization       Patient Specific Goal: \" Get my hands better\"    Plan   Goals : (short term and long term)  1. Patient will tolerate splinting for the left/ right hands and will demonstrate proper wear. 2. Improve L ring finger AROM at the MCP to aide in improving functional hand use. 3. Patient will demonstrate understand of stretching and/or exercise provided for HEP  4. Prevent further joint contracture in the right index finger. ________________________________________________________________________________________________   Reason for Referral: Pt presents with a Hx of left ring finger trigger finger release. He is roughly 5 months from surgery. He states over the past 2 months he has noticed some flexing of his middle finger. He states that this has progressively gotten worse and is interfering with his activities now. He is also complaining of a contracture of his right index finger. Pt says he was given a LMB extension brace for the right index finger but feels it is not helping. Pt did not bring it with him today. Pt states tasks that require using a flat hand like washing the car and bathing are more difficult. He says the finger gets in the way and makes daily activities a challenge. L hand status: the left palm has significant heavy scarring post trigger release. The scar was recently injected in efforts to help with scar mgmt. The L ring finger is held in flexion at the MCP. Passive extension at the MCP is limited. R hand status:  The right index finger is the sore digit with a resting PIP contracture of 25 degrees. Passive extension at the PIP shows a 10 degree contracture. Treatment:    Splint Fabricated/Provided: A volar left hand custom splint was fabricated to increase MCP extension at the left ring finger. Care was taken to leave the other digits free. Stockinette was provided for added comfort. Pt tolerated splinting well. Plans are to provide progressive statis splinting as needed to improve ROM. Education Provided: Patient was provided with verbal education regarding splint care, wear, precautions, and hygiene    Splint Care: Splint can be washed with mild soap and cool water. Splint needs to air dry. Avoid leaving splint in or near a source of heat such as a hot car or a space heater. Use nail polish remover to remove stains on splint. Use Purell will decrease any odor that may develop. Splint Precautions: Patient was instructed to remove splint and contact therapist if the following occur:   1. Redness that lasts longer that 15-20 mins   2. Increased swelling   3. Increased pain   4. Pressure Sores    Wear Schedule: Pt is to wear the splint to bed in the evening. In addition, pt is to bring the LMB splint he received to address PIP limitations in the RIF to his next OT session.  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    Rehab Potential: Good   Recommendations: Outpatient OT  Goal Formulation: Patient  Requires OT Follow Up: Yes     Time in- 0800        Time out 0900    CODE  Minutes  Units   74177 OT Eval Low 30 1   29 [address]     [id number] OT Eval High     83572 Fluidotherapy     [id number] Manual     25943 Therapeutic Ex     79383 Therapeutic Activity     16974 ADL/COMP Tech Train     [id number] Neuromuscular Re-Ed     05450 OrthoManagementTraining 30 2   39934 Paraffin     [id number] Electrical Stim - Attended     [id number] Iontophoresis     [id number] Ultrasound      Other                Electronically signed by: Jeremiah Escalera OT /TYLER  [id number]     [id number] Certification / Comments      Frequency/Duration 1x / week for 6 visits. Certification period From: 10-28-21  To: 1-28-22     I have reviewed the Plan of Care established for skilled therapy services and certify that the services are required and that they will be provided while the patient is under my care. Physician's Comments/Revisions:       Physicians's Printed Name:  Dr Sophia Morgan                                 Physician's Signature:                                                               Date:      Please review Patient's OT evaluation and if you agree sign/date and fax back to us at our [facility] OT Fax: [phone].  Thank you for your referral!

## 2021-10-29 PROBLEM — M20.091: Status: ACTIVE | Noted: 2021-10-29

## 2021-10-29 PROBLEM — M24.542 CONTRACTURE OF JOINT OF LEFT HAND: Status: ACTIVE | Noted: 2021-10-29

## 2021-11-05 ENCOUNTER — TREATMENT (OUTPATIENT)
Dept: OCCUPATIONAL THERAPY | Age: 86
End: 2021-11-05
Payer: MEDICARE

## 2021-11-05 DIAGNOSIS — M24.542 CONTRACTURE OF JOINT OF LEFT HAND: Primary | ICD-10-CM

## 2021-11-05 DIAGNOSIS — M20.091 CONTRACTURE OF RIGHT INDEX FINGER: ICD-10-CM

## 2021-11-05 PROCEDURE — 97018 PARAFFIN BATH THERAPY: CPT | Performed by: OCCUPATIONAL THERAPIST

## 2021-11-05 PROCEDURE — 97140 MANUAL THERAPY 1/> REGIONS: CPT | Performed by: OCCUPATIONAL THERAPIST

## 2021-11-05 PROCEDURE — 97760 ORTHOTIC MGMT&TRAING 1ST ENC: CPT | Performed by: OCCUPATIONAL THERAPIST

## 2021-11-05 NOTE — PROGRESS NOTES
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 Functional mobility             [x]? ADLs          []? Strength                  []? Cognition   []? Functional transfers           []? IADLs         []? Safety Awareness  []? Endurance   []? Fine Motor Coordination    []? Balance      []? Vision/perception   []? Sensation     []? Gross Motor Coordination [x]? ROM           [x]? Pain                        []? Edema          [x]? Scar Adhesion/Skin Integrity      OT PLAN OF CARE   OT POC based on physician orders, patient diagnosis and results of clinical assessment     Frequency/Duration: 1x/ week x 6 weeks  Specific OT Treatment to include:      [x]? Instruction in HEP                   Modalities:  [x]?  Therapeutic Exercise                 [x]? Ultrasound               []? Electrical Stimulation/Attended  [x]? PROM/Stretching                    []? Fluidotherapy          [x]?   Paraffin                   []? AAROM  [x]?  AROM                 []? Iontophoresis:   []? Tendon Angela Zepeda  []? Neuromuscular Re-Ed            []? ADL/IADL re-training    [x]?  Therapeutic Activity                  [x]? Pain Management with/without modalities PRN                 [x]?  Manual Therapy                      [x]? Splinting                                   [x]? Scar Management                   []?Joint Protection/Training  []? Ergonomics                             []? Joint Mobilization                      []? Adaptive Equipment Assessment/Training                             []? Manual Edema Mobilization   []? Myofascial Release                 []? Energy Conservation/Work Simplification  []? GM/FM Coordination                []? Safety retraining/education per  individual diagnosis/goals  []? Desensitization        Patient Specific Goal: \" Get my hands better\"     Plan   Goals : (short term and long term)  1. Patient will tolerate splinting for the left/ right hands and will demonstrate proper wear. 2. Improve L ring finger AROM at the MCP to aide in improving functional hand use. 3. Patient will demonstrate understand of stretching and/or exercise provided for HEP  4. Prevent further joint contracture in the right index finger. ________________________________________________________________________      Pain Level: No pain complaints  Subjective: \"I think it is a little better, I think we can move that brace up now. \"  Objective:  Updated POC to be completed by 11-29-21.     INTERVENTION: COMPLETED: SPECIFICS/COMMENTS:   Modality:     Paraffin Tx L hand     US L palm/ hand     Manual techniques     Scar massage x - left hand/ palmar/ LRF and LSF        PROM/ Stretching     Stretching LRF x - focus on stretching into MCP extension at ring finger     - manual stretch in conjunction with palmar massage   Splinting     L hand MCP extension splint/ LRF X  x - modified to increase extension to close to neutral  -modified to add comfort and less pressure around the thenar   R index LMB  brace x - fit checked  -splint modified to provide added pressure on getting the PIP straight             Other:                 Assessment/Comments: Pt is making Good progress toward stated plan of care. Both splints were modified today to increase extension in the left ring finger MCP and the right index PIP. Pt is compliant with all home instruction. He likes using the massage star we loaned him. He plans to buy one for home for scar tissue mgmt. Density of scar tissue is better today in the L palm. Will continue.     -Rehab Potential: Good  -Requires OT Follow Up: Yes  Time In:0800            Time Out: 0840             Visit #: 2    Treatment Charges: Mins Units   Modalities:paraffin 10 1   Ther Exercise     Manual Therapy 10 1   Thera Activities     ADL/Home Mgt      Neuro Re-education     Group Therapy     Non-Billable Service Time     Other: ortho fit 20 1   Total Time/Units 40 3       -Response to Treatment: Good session. Goals: Goals for pt can be see on initial eval occurring on 10-29-21    Plan:   [x]  Continue Plan of care: Add US to scar mgmt at next visit. Pt education continues at each visit to obtain maximum benefits from skilled OT intervention.   []  Alter Plan of care:   []  Discharge:      Peg Rmasey OT /TYLER  [id number]

## 2021-11-11 ENCOUNTER — TREATMENT (OUTPATIENT)
Dept: OCCUPATIONAL THERAPY | Age: 86
End: 2021-11-11
Payer: MEDICARE

## 2021-11-11 DIAGNOSIS — M20.091 CONTRACTURE OF RIGHT INDEX FINGER: ICD-10-CM

## 2021-11-11 DIAGNOSIS — M24.542 CONTRACTURE OF JOINT OF LEFT HAND: Primary | ICD-10-CM

## 2021-11-11 PROCEDURE — 97760 ORTHOTIC MGMT&TRAING 1ST ENC: CPT | Performed by: OCCUPATIONAL THERAPIST

## 2021-11-11 PROCEDURE — 97018 PARAFFIN BATH THERAPY: CPT | Performed by: OCCUPATIONAL THERAPIST

## 2021-11-11 PROCEDURE — 97140 MANUAL THERAPY 1/> REGIONS: CPT | Performed by: OCCUPATIONAL THERAPIST

## 2021-11-11 NOTE — PROGRESS NOTES
[]? ADL/IADL re-training    [x]?  Therapeutic Activity                  [x]? Pain Management with/without modalities PRN                 [x]?  Manual Therapy                      [x]? Splinting                                   [x]? Scar Management                   []?Joint Protection/Training  []? Ergonomics                             []? Joint Mobilization                      []? Adaptive Equipment Assessment/Training                             []? Manual Edema Mobilization   []? Myofascial Release                 []? Energy Conservation/Work Simplification  []? GM/FM Coordination                []? Safety retraining/education per  individual diagnosis/goals  []? Desensitization        Patient Specific Goal: \" Get my hands better\"     Plan   Goals : (short term and long term)  1. Patient will tolerate splinting for the left/ right hands and will demonstrate proper wear. 2. Improve L ring finger AROM at the MCP to aide in improving functional hand use. 3. Patient will demonstrate understand of stretching and/or exercise provided for HEP  4. Prevent further joint contracture in the right index finger. ________________________________________________________________________      Pain Level: No pain complaints  Subjective: \"I got that massage tool. I am using it for the ring finger and the small finger (has started triggering)\". Objective:  Updated POC to be completed by 11-29-21.     INTERVENTION: COMPLETED: SPECIFICS/COMMENTS:   Modality:     Paraffin Tx L hand x    US L palm/ hand     Manual techniques     Scar massage x - left hand/ palmar/ LRF and LSF        PROM/ Stretching     Stretching LRF x - focus on stretching into MCP extension at ring finger     - manual stretch in conjunction with palmar massage   Splinting     L hand MCP extension splint/ LRF X  x - modified to increase extension to close to neutral  -modified to add comfort and less pressure around the thenar   R index LMB  brace x - fit checked  -splint modified to further secure the hand to prevent affected MCP to bridge ut of the splint. Other:                 Assessment/Comments: Pt is making Good progress toward stated plan of care. Pt states the index finger LMB splint does cause some discomfort due to stretching the joint. Pt says he is wearing the splint in the evening for 30 min. Pt is encouraged to increase wear to 3 sets of 30 min  In the evenings. Minor changes made to secure the left hand into the splint. Will continue. LRF MCP ext- -40 degrees    -Rehab Potential: Good  -Requires OT Follow Up: Yes  Time In:0820            Time Out: 0900            Visit #: 3    Treatment Charges: Mins Units   Modalities:paraffin 10 1   Ther Exercise     Manual Therapy 20 1   Thera Activities     ADL/Home Mgt      Neuro Re-education     Group Therapy     Non-Billable Service Time     Other: ortho fit 10 1   Total Time/Units 40 3       -Response to Treatment: Good session. Goals: Goals for pt can be see on initial eval occurring on 10-29-21    Plan:   [x]  Continue Plan of care: Follow up in 2 weeks. Pt education continues at each visit to obtain maximum benefits from skilled OT intervention.   []  Alter Plan of care:   []  Discharge:      Natalia Oswald OT /L  [id number]

## 2021-12-02 ENCOUNTER — TREATMENT (OUTPATIENT)
Dept: OCCUPATIONAL THERAPY | Age: 86
End: 2021-12-02
Payer: MEDICARE

## 2021-12-02 DIAGNOSIS — M20.091 CONTRACTURE OF RIGHT INDEX FINGER: ICD-10-CM

## 2021-12-02 DIAGNOSIS — M24.542 CONTRACTURE OF JOINT OF LEFT HAND: Primary | ICD-10-CM

## 2021-12-02 PROCEDURE — 97140 MANUAL THERAPY 1/> REGIONS: CPT | Performed by: OCCUPATIONAL THERAPIST

## 2021-12-02 PROCEDURE — 97018 PARAFFIN BATH THERAPY: CPT | Performed by: OCCUPATIONAL THERAPIST

## 2021-12-02 PROCEDURE — 97530 THERAPEUTIC ACTIVITIES: CPT | Performed by: OCCUPATIONAL THERAPIST

## 2021-12-02 NOTE — PROGRESS NOTES
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 Functional mobility             [x]? ADLs          []? Strength                  []? Cognition   []? Functional transfers           []? IADLs         []? Safety Awareness  []? Endurance   []? Fine Motor Coordination    []? Balance      []? Vision/perception   []? Sensation     []? Gross Motor Coordination [x]? ROM           [x]? Pain                        []? Edema          [x]? Scar Adhesion/Skin Integrity      OT PLAN OF CARE   OT POC based on physician orders, patient diagnosis and results of clinical assessment     Frequency/Duration: 1x/ week x 6 weeks  Specific OT Treatment to include:      [x]? Instruction in HEP                   Modalities:  [x]?  Therapeutic Exercise                 [x]? Ultrasound               []? Electrical Stimulation/Attended  [x]? PROM/Stretching                    []? Fluidotherapy          [x]?   Paraffin                   []? AAROM  [x]?  AROM                 []? Iontophoresis:   []? Tendon Doloris Arm  []? Neuromuscular Re-Ed            []? ADL/IADL re-training    [x]?  Therapeutic Activity                  [x]? Pain Management with/without modalities PRN                 [x]?  Manual Therapy                      [x]? Splinting                                   [x]? Scar Management                   []?Joint Protection/Training  []? Ergonomics                             []? Joint Mobilization                      []? Adaptive Equipment Assessment/Training                             []? Manual Edema Mobilization   []? Myofascial Release                 []? Energy Conservation/Work Simplification  []? GM/FM Coordination                []? Safety retraining/education per  individual diagnosis/goals  []? Desensitization        Patient Specific Goal: \" Get my hands better\"     Plan (update 12/2/21)  Goals : (short term and long term)    1. Patient will tolerate splinting for the left/ right hands and will demonstrate proper wear. (goal met/ pt has static progressive splint for the L hand and a modified LMB extension splint for the RIF)    2. Improve L ring finger AROM at the MCP to aide in improving functional hand use. (slow improvements noted/ LRF MCP lacks 32 degrees extension)    3. Patient will demonstrate understand of stretching and/or exercise provided for HEP (goal met)    4. Prevent further joint contracture in the right index finger.(goal met- Active PIP extension -16 degrees from -25 degrees / passive PIP extension is unchanged at -10 degrees)      ________________________________________________________________________      Pain Level: No pain complaints  Subjective: \"I think my hand has started to plateau. I am not sure if anything can be done. \"  Objective:  Updated POC to be completed by 11-29-21.     INTERVENTION: COMPLETED: SPECIFICS/COMMENTS:   Modality:     Paraffin Tx L hand x    US L palm/ hand     Manual techniques     Scar massage x - left hand/ palmar/ LRF and LSF        PROM/ Stretching     Stretching LRF x - focus on stretching into MCP extension at ring finger     - manual stretch in conjunction with palmar massage   Splinting     L hand MCP extension splint/ LRF X  x - modified to increase extension to close to neutral  -modified to add comfort and less pressure around the thenar   R index LMB  brace x - fit checked  -splint modified to further secure the hand to prevent affected MCP to bridge ut of the splint. Other:                 Assessment/Comments: Pt is making Good progress toward stated plan of care. Pt has gained 8 degrees extension in the LRF MCP. The RIF is also showing signs of improvement. No adjustments are needed to his current braces and compliance with home programming remains good. At this point, no further skilled OT is recommended. Pt is to continue scar massage, stretches and splint wear at home as instructed. Pt demonstrates and verbalizes understanding.     -Rehab Potential: Good  -Requires OT Follow Up: Yes  Time In:0800          Time Out: 0840            Visit #: 4    Treatment Charges: Mins Units   Modalities:paraffin 10 1   Ther Exercise     Manual Therapy 10 1   Thera Activities 20 1   ADL/Home Mgt      Neuro Re-education     Group Therapy     Non-Billable Service Time     Other: ortho fit     Total Time/Units 40 3       -Response to Treatment: Tx tolerated well. Goals: Goals for pt can be see on initial eval occurring on 10-29-21    Plan:   []  Continue Plan of care: Follow up in 2 weeks. Pt education continues at each visit to obtain maximum benefits from skilled OT intervention. []  Alter Plan of care:   [x]  Discharge: Discharge with HEP and continued splint use.        Matty Wesley, OT /L  [id number]

## 2022-01-02 ENCOUNTER — HOSPITAL ENCOUNTER (EMERGENCY)
Age: 87
Discharge: HOME | End: 2022-01-02
Attending: STUDENT IN AN ORGANIZED HEALTH CARE EDUCATION/TRAINING PROGRAM
Payer: MEDICARE

## 2022-01-02 ENCOUNTER — APPOINTMENT (OUTPATIENT)
Dept: CT IMAGING | Age: 87
End: 2022-01-02
Payer: MEDICARE

## 2022-01-02 ENCOUNTER — APPOINTMENT (OUTPATIENT)
Dept: GENERAL RADIOLOGY | Age: 87
End: 2022-01-02
Payer: MEDICARE

## 2022-01-02 VITALS
BODY MASS INDEX: 27.98 KG/M2 | TEMPERATURE: 97.6 F | HEART RATE: 81 BPM | SYSTOLIC BLOOD PRESSURE: 148 MMHG | RESPIRATION RATE: 18 BRPM | WEIGHT: 195 LBS | OXYGEN SATURATION: 95 % | DIASTOLIC BLOOD PRESSURE: 90 MMHG

## 2022-01-02 DIAGNOSIS — R07.9 RIGHT-SIDED CHEST PAIN: Primary | ICD-10-CM

## 2022-01-02 DIAGNOSIS — R91.1 LESION OF RIGHT LUNG: ICD-10-CM

## 2022-01-02 DIAGNOSIS — J90 PLEURAL EFFUSION, RIGHT: ICD-10-CM

## 2022-01-02 LAB
ALBUMIN SERPL-MCNC: 4.2 G/DL (ref 3.5–5.2)
ALP BLD-CCNC: 69 U/L (ref 40–129)
ALT SERPL-CCNC: 15 U/L (ref 0–40)
ANION GAP SERPL CALCULATED.3IONS-SCNC: 10 MMOL/L (ref 7–16)
AST SERPL-CCNC: 17 U/L (ref 0–39)
BASOPHILS ABSOLUTE: 0.03 E9/L (ref 0–0.2)
BASOPHILS RELATIVE PERCENT: 0.3 % (ref 0–2)
BILIRUB SERPL-MCNC: 0.3 MG/DL (ref 0–1.2)
BUN BLDV-MCNC: 24 MG/DL (ref 6–23)
CALCIUM SERPL-MCNC: 9.4 MG/DL (ref 8.6–10.2)
CHLORIDE BLD-SCNC: 102 MMOL/L (ref 98–107)
CO2: 27 MMOL/L (ref 22–29)
CREAT SERPL-MCNC: 0.9 MG/DL (ref 0.7–1.2)
D DIMER: 277 NG/ML DDU
EOSINOPHILS ABSOLUTE: 0.12 E9/L (ref 0.05–0.5)
EOSINOPHILS RELATIVE PERCENT: 1.4 % (ref 0–6)
GFR AFRICAN AMERICAN: >60
GFR NON-AFRICAN AMERICAN: >60 ML/MIN/1.73
GLUCOSE BLD-MCNC: 128 MG/DL (ref 74–99)
HCT VFR BLD CALC: 41.3 % (ref 37–54)
HEMOGLOBIN: 14 G/DL (ref 12.5–16.5)
IMMATURE GRANULOCYTES #: 0.02 E9/L
IMMATURE GRANULOCYTES %: 0.2 % (ref 0–5)
LACTIC ACID: 0.7 MMOL/L (ref 0.5–2.2)
LIPASE: 29 U/L (ref 13–60)
LYMPHOCYTES ABSOLUTE: 1.29 E9/L (ref 1.5–4)
LYMPHOCYTES RELATIVE PERCENT: 14.8 % (ref 20–42)
MCH RBC QN AUTO: 33.7 PG (ref 26–35)
MCHC RBC AUTO-ENTMCNC: 33.9 % (ref 32–34.5)
MCV RBC AUTO: 99.5 FL (ref 80–99.9)
MONOCYTES ABSOLUTE: 0.62 E9/L (ref 0.1–0.95)
MONOCYTES RELATIVE PERCENT: 7.1 % (ref 2–12)
NEUTROPHILS ABSOLUTE: 6.66 E9/L (ref 1.8–7.3)
NEUTROPHILS RELATIVE PERCENT: 76.2 % (ref 43–80)
PDW BLD-RTO: 12.1 FL (ref 11.5–15)
PLATELET # BLD: 191 E9/L (ref 130–450)
PMV BLD AUTO: 9 FL (ref 7–12)
POTASSIUM SERPL-SCNC: 4.1 MMOL/L (ref 3.5–5)
PRO-BNP: 1589 PG/ML (ref 0–450)
RBC # BLD: 4.15 E12/L (ref 3.8–5.8)
SODIUM BLD-SCNC: 139 MMOL/L (ref 132–146)
TOTAL PROTEIN: 6.8 G/DL (ref 6.4–8.3)
TROPONIN, HIGH SENSITIVITY: 42 NG/L (ref 0–11)
TROPONIN, HIGH SENSITIVITY: 50 NG/L (ref 0–11)
TROPONIN, HIGH SENSITIVITY: 50 NG/L (ref 0–11)
WBC # BLD: 8.7 E9/L (ref 4.5–11.5)

## 2022-01-02 PROCEDURE — 6360000002 HC RX W HCPCS: Performed by: STUDENT IN AN ORGANIZED HEALTH CARE EDUCATION/TRAINING PROGRAM

## 2022-01-02 PROCEDURE — 6360000004 HC RX CONTRAST MEDICATION: Performed by: RADIOLOGY

## 2022-01-02 PROCEDURE — 71046 X-RAY EXAM CHEST 2 VIEWS: CPT

## 2022-01-02 PROCEDURE — 99283 EMERGENCY DEPT VISIT LOW MDM: CPT

## 2022-01-02 PROCEDURE — 85025 COMPLETE CBC W/AUTO DIFF WBC: CPT

## 2022-01-02 PROCEDURE — 96374 THER/PROPH/DIAG INJ IV PUSH: CPT

## 2022-01-02 PROCEDURE — 83880 ASSAY OF NATRIURETIC PEPTIDE: CPT

## 2022-01-02 PROCEDURE — 6370000000 HC RX 637 (ALT 250 FOR IP): Performed by: PHYSICIAN ASSISTANT

## 2022-01-02 PROCEDURE — 83605 ASSAY OF LACTIC ACID: CPT

## 2022-01-02 PROCEDURE — 85378 FIBRIN DEGRADE SEMIQUANT: CPT

## 2022-01-02 PROCEDURE — 84484 ASSAY OF TROPONIN QUANT: CPT

## 2022-01-02 PROCEDURE — 36415 COLL VENOUS BLD VENIPUNCTURE: CPT

## 2022-01-02 PROCEDURE — 2580000003 HC RX 258: Performed by: STUDENT IN AN ORGANIZED HEALTH CARE EDUCATION/TRAINING PROGRAM

## 2022-01-02 PROCEDURE — 80053 COMPREHEN METABOLIC PANEL: CPT

## 2022-01-02 PROCEDURE — 93005 ELECTROCARDIOGRAM TRACING: CPT | Performed by: PHYSICIAN ASSISTANT

## 2022-01-02 PROCEDURE — 71275 CT ANGIOGRAPHY CHEST: CPT

## 2022-01-02 PROCEDURE — 83690 ASSAY OF LIPASE: CPT

## 2022-01-02 RX ORDER — ASPIRIN 81 MG/1
324 TABLET, CHEWABLE ORAL ONCE
Status: COMPLETED | OUTPATIENT
Start: 2022-01-02 | End: 2022-01-02

## 2022-01-02 RX ORDER — HYDROCODONE BITARTRATE AND ACETAMINOPHEN 5; 325 MG/1; MG/1
1 TABLET ORAL EVERY 6 HOURS PRN
Qty: 12 TABLET | Refills: 0 | Status: SHIPPED | OUTPATIENT
Start: 2022-01-02 | End: 2022-01-05

## 2022-01-02 RX ORDER — 0.9 % SODIUM CHLORIDE 0.9 %
1000 INTRAVENOUS SOLUTION INTRAVENOUS ONCE
Status: COMPLETED | OUTPATIENT
Start: 2022-01-02 | End: 2022-01-02

## 2022-01-02 RX ORDER — FENTANYL CITRATE 50 UG/ML
50 INJECTION, SOLUTION INTRAMUSCULAR; INTRAVENOUS ONCE
Status: COMPLETED | OUTPATIENT
Start: 2022-01-02 | End: 2022-01-02

## 2022-01-02 RX ADMIN — IOPAMIDOL 75 ML: 755 INJECTION, SOLUTION INTRAVENOUS at 17:01

## 2022-01-02 RX ADMIN — SODIUM CHLORIDE 1000 ML: 9 INJECTION, SOLUTION INTRAVENOUS at 15:02

## 2022-01-02 RX ADMIN — FENTANYL CITRATE 50 MCG: 50 INJECTION, SOLUTION INTRAMUSCULAR; INTRAVENOUS at 15:02

## 2022-01-02 RX ADMIN — ASPIRIN 324 MG: 81 TABLET, CHEWABLE ORAL at 13:30

## 2022-01-02 ASSESSMENT — ENCOUNTER SYMPTOMS
CONSTIPATION: 0
RHINORRHEA: 0
DIARRHEA: 0
COUGH: 0
SORE THROAT: 0
BLOOD IN STOOL: 0
VOMITING: 0
ABDOMINAL PAIN: 0
BACK PAIN: 0
SHORTNESS OF BREATH: 1
NAUSEA: 0

## 2022-01-02 ASSESSMENT — PAIN SCALES - GENERAL
PAINLEVEL_OUTOF10: 10
PAINLEVEL_OUTOF10: 10

## 2022-01-02 NOTE — ED PROVIDER NOTES
Jose G Courtney is a 82 y.o. male with a PMHx significant for chronic right shoulder pain, enlarged prostate who presents for evaluation of chest pain, beginning prior to arrival.  The complaint has been persistent, moderate in severity, and worsened by movement. The patient states that this morning around 11 AM he was emptying out to coolers with water. Notes that 20 minutes later he began to have right-sided chest discomfort. Notes history of issues with his right shoulder in the past, but states is never been this bad. Notes it is a sharp pain in that shoulder that radiates around towards his back. Denies any tearing discomfort. Notes that the pain did become a 10 out of 10 and severe for short period time but has since improved but is still there. Denies any associated nausea, vomiting, diaphoresis. Notes it does feel a little bit difficult to take a deep breath in and out. Denies any history of blood clots in the past.  Has seen Dr. Scot Richmond for cardiology, last stress test was 2 years ago. The history is provided by the patient and medical records. Review of Systems   Constitutional: Negative for chills and fever. HENT: Negative for postnasal drip, rhinorrhea and sore throat. Eyes: Negative for visual disturbance. Respiratory: Positive for shortness of breath. Negative for cough. Cardiovascular: Positive for chest pain. Gastrointestinal: Negative for abdominal pain, blood in stool, constipation, diarrhea, nausea and vomiting. Genitourinary: Negative for difficulty urinating, dysuria and urgency. Musculoskeletal: Negative for back pain. Right shoulder pain   Skin: Negative for rash. Neurological: Negative for dizziness, numbness and headaches. Physical Exam  Vitals and nursing note reviewed. Constitutional:       General: He is in acute distress (Appears uncomfortable). Appearance: Normal appearance. He is well-developed. He is not ill-appearing.    HENT: Head: Normocephalic and atraumatic. Right Ear: External ear normal.      Left Ear: External ear normal.   Eyes:      General:         Right eye: No discharge. Left eye: No discharge. Extraocular Movements: Extraocular movements intact. Conjunctiva/sclera: Conjunctivae normal.   Cardiovascular:      Rate and Rhythm: Normal rate and regular rhythm. Heart sounds: Normal heart sounds. No murmur heard. Pulmonary:      Effort: Pulmonary effort is normal. No respiratory distress. Breath sounds: Normal breath sounds. Abdominal:      General: There is no distension. Palpations: Abdomen is soft. There is no mass. Tenderness: There is no abdominal tenderness. Hernia: No hernia is present. Musculoskeletal:         General: Tenderness present. No swelling. Cervical back: Normal range of motion and neck supple. Comments: Tenderness palpation along the right chest wall, worse with pushing against pressure   Skin:     General: Skin is warm and dry. Neurological:      Mental Status: He is alert and oriented to person, place, and time. Cranial Nerves: No cranial nerve deficit. Coordination: Coordination normal.          Procedures     PATRICIA Gauthieraidan Berry presents to the ED for evaluation of right-sided chest and arm pain. Workup in the ED revealed slight elevation troponin 50, delta was 50 followed by 42. Patient had an elevated D-dimer therefore CTA was ordered. CTA pulmonary demonstrating an aggressive appearing lesion in the right upper lung eroding through the second rib patient denies any history of being told this in the past.  He notes he has an appoint with his PCP tomorrow, discussed possible admission for further work-up, however as he has good follow-up patient felt safe to be discharged. Patient continues to be non-toxic on re-evaluation.  Findings were discussed with the patient and reasons to immediately return to the ED were articulated to them. They will follow-up with their PCP.    --------------------------------------------- PAST HISTORY ---------------------------------------------  Past Medical History:  has a past medical history of Arthritis, Oscar esophagus, Cancer ([facility] [address] Prostate irregularity, and Trigger finger, left ring finger. Past Surgical History:  has a past surgical history that includes hernia repair; Skin cancer excision; Leg Surgery (Left, 2011); Ankle Fusion (Left); and Finger trigger release (Left, 5/21/2021). Social History:  reports that he has never smoked. He has never used smokeless tobacco. He reports that he does not drink alcohol and does not use drugs. Family History: family history is not on file. The patients home medications have been reviewed. Allergies: Patient has no known allergies.     -------------------------------------------------- RESULTS -------------------------------------------------  Labs:  Results for orders placed or performed during the hospital encounter of 01/02/22   CBC Auto Differential   Result Value Ref Range    WBC 8.7 4.5 - 11.5 E9/L    RBC 4.15 3.80 - 5.80 E12/L    Hemoglobin 14.0 12.5 - 16.5 g/dL    Hematocrit 41.3 37.0 - 54.0 %    MCV 99.5 80.0 - 99.9 fL    MCH 33.7 26.0 - 35.0 pg    MCHC 33.9 32.0 - 34.5 %    RDW 12.1 11.5 - 15.0 fL    Platelets 825 891 - 337 E9/L    MPV 9.0 7.0 - 12.0 fL    Neutrophils % 76.2 43.0 - 80.0 %    Immature Granulocytes % 0.2 0.0 - 5.0 %    Lymphocytes % 14.8 (L) 20.0 - 42.0 %    Monocytes % 7.1 2.0 - 12.0 %    Eosinophils % 1.4 0.0 - 6.0 %    Basophils % 0.3 0.0 - 2.0 %    Neutrophils Absolute 6.66 1.80 - 7.30 E9/L    Immature Granulocytes # 0.02 E9/L    Lymphocytes Absolute 1.29 (L) 1.50 - 4.00 E9/L    Monocytes Absolute 0.62 0.10 - 0.95 E9/L    Eosinophils Absolute 0.12 0.05 - 0.50 E9/L    Basophils Absolute 0.03 0.00 - 0.20 E9/L   Comprehensive Metabolic Panel   Result Value Ref Range    Sodium 139 132 - 146 mmol/L Potassium 4.1 3.5 - 5.0 mmol/L    Chloride 102 98 - 107 mmol/L    CO2 27 22 - 29 mmol/L    Anion Gap 10 7 - 16 mmol/L    Glucose 128 (H) 74 - 99 mg/dL    BUN 24 (H) 6 - 23 mg/dL    CREATININE 0.9 0.7 - 1.2 mg/dL    GFR Non-African American >60 >=60 mL/min/1.73    GFR African American >60     Calcium 9.4 8.6 - 10.2 mg/dL    Total Protein 6.8 6.4 - 8.3 g/dL    Albumin 4.2 3.5 - 5.2 g/dL    Total Bilirubin 0.3 0.0 - 1.2 mg/dL    Alkaline Phosphatase 69 40 - 129 U/L    ALT 15 0 - 40 U/L    AST 17 0 - 39 U/L   Troponin   Result Value Ref Range    Troponin, High Sensitivity 50 (H) 0 - 11 ng/L   Troponin   Result Value Ref Range    Troponin, High Sensitivity 50 (H) 0 - 11 ng/L   Brain Natriuretic Peptide   Result Value Ref Range    Pro-BNP 1,589 (H) 0 - 450 pg/mL   D-Dimer, Quantitative   Result Value Ref Range    D-Dimer, Quant 277 ng/mL DDU   Lipase   Result Value Ref Range    Lipase 29 13 - 60 U/L   Lactic Acid, Plasma   Result Value Ref Range    Lactic Acid 0.7 0.5 - 2.2 mmol/L   Troponin   Result Value Ref Range    Troponin, High Sensitivity 42 (H) 0 - 11 ng/L   EKG 12 Lead   Result Value Ref Range    Ventricular Rate 94 BPM    Atrial Rate 94 BPM    P-R Interval 146 ms    QRS Duration 90 ms    Q-T Interval 346 ms    QTc Calculation (Bazett) 432 ms    P Axis 15 degrees    R Axis -33 degrees    T Axis 45 degrees       Radiology:  CTA PULMONARY W CONTRAST   Final Result   1. There is an aggressive appearing lesion in the right upper lung eroding   through the 2nd rib and tissue sampling or PET scan may be helpful in better   characterization. 2. Small pleural based 11 mm opacity in the right mid lung that is of   uncertain chronicity and correlation with a PET scan may be helpful. 3. Moderate right pleural effusion with adjacent compressive atelectasis. 4. No CT evidence of pulmonary emboli   5. There are bibasilar opacities felt represent atelectasis.    6. Small fixed hiatus hernia      RECOMMENDATIONS: Unavailable         XR CHEST (2 VW)   Final Result   Pleural-based 5.4 cm right apical lung mass. Correlation with CT of the   chest is recommended, as malignancy is not excluded. Bibasilar airspace opacities with possible small bilateral pleural effusions. These findings are nonspecific and may be on the basis of mild pulmonary   edema. Multifocal pneumonia could have a similar appearance. ------------------------- NURSING NOTES AND VITALS REVIEWED ---------------------------  Date / Time Roomed:  1/2/2022  2:20 PM  ED Bed Assignment:  13/13    The nursing notes within the ED encounter and vital signs as below have been reviewed. BP (!) 148/90   Pulse 81   Temp 97.6 °F (36.4 °C) (Tympanic)   Resp 18   Wt 195 lb (88.5 kg)   SpO2 95%   BMI 27.98 kg/m²   Oxygen Saturation Interpretation: Normal      ------------------------------------------ PROGRESS NOTES ------------------------------------------  9:40 PM EST  I have spoken with the patient and discussed todays results, in addition to providing specific details for the plan of care and counseling regarding the diagnosis and prognosis. Their questions are answered at this time and they are agreeable with the plan. I discussed at length with them reasons for immediate return here for re evaluation. They will followup with their primary care physician by calling their office tomorrow. --------------------------------- ADDITIONAL PROVIDER NOTES ---------------------------------  At this time the patient is without objective evidence of an acute process requiring hospitalization or inpatient management. They have remained hemodynamically stable throughout their entire ED visit and are stable for discharge with outpatient follow-up. The plan has been discussed in detail and they are aware of the specific conditions for emergent return, as well as the importance of follow-up.       Discharge Medication List as of 1/2/2022  5:50 PM

## 2022-01-02 NOTE — ED NOTES
FIRST PROVIDER CONTACT ASSESSMENT NOTE           Department of Emergency Medicine                 First Provider Note            22  1:17 PM EST    Date of Encounter: No admission date for patient encounter. Patient Name: Zina Olsen  : 1935  MRN: [id number]    Chief Complaint: Chest Pain (R side x 1.5 hrs)      History of Present Illness:   Zina Olsen is a 82 y.o. male who presents to the ED for chest pain and SOB that started 1.5 hours PTA. Focused Physical Exam:  VS:    ED Triage Vitals [22 1315]   BP Temp Temp Source Pulse Resp SpO2 Height Weight   -- 97.6 °F (36.4 °C) Tympanic 88 -- 97 % -- --        Physical Ex: Constitutional: Alert and non-toxic. Medical History:  has a past medical history of Arthritis, Oscar esophagus, Cancer ([address] Prostate irregularity, and Trigger finger, left ring finger. Surgical History:  has a past surgical history that includes hernia repair; Skin cancer excision; Leg Surgery (Left, ); Ankle Fusion (Left); and Finger trigger release (Left, 2021). Social History:  reports that he has never smoked. He has never used smokeless tobacco. He reports that he does not drink alcohol and does not use drugs. Family History: family history is not on file. Allergies: Patient has no known allergies.      Initial Plan of Care: Initiate Treatment-Testing, Proceed toTreatment Area When Bed Available for ED Attending/MLP to Continue Care      ---END OF FIRST PROVIDER CONTACT ASSESSMENT NOTE---  Electronically signed by Maria Isabel Burris PA-C   DD: 22         Maria Isabel Burris PA-C  22 [id number]

## 2022-01-03 LAB
EKG ATRIAL RATE: 94 BPM
EKG P AXIS: 15 DEGREES
EKG P-R INTERVAL: 146 MS
EKG Q-T INTERVAL: 346 MS
EKG QRS DURATION: 90 MS
EKG QTC CALCULATION (BAZETT): 432 MS
EKG R AXIS: -33 DEGREES
EKG T AXIS: 45 DEGREES
EKG VENTRICULAR RATE: 94 BPM

## 2022-01-03 PROCEDURE — 93010 ELECTROCARDIOGRAM REPORT: CPT | Performed by: INTERNAL MEDICINE

## 2022-07-21 ENCOUNTER — HOSPITAL ENCOUNTER (EMERGENCY)
Age: 87
Discharge: HOME | End: 2022-07-21
Payer: MEDICARE

## 2022-07-21 VITALS
WEIGHT: 168 LBS | TEMPERATURE: 97.3 F | RESPIRATION RATE: 20 BRPM | HEART RATE: 100 BPM | SYSTOLIC BLOOD PRESSURE: 128 MMHG | DIASTOLIC BLOOD PRESSURE: 72 MMHG | BODY MASS INDEX: 25.46 KG/M2 | OXYGEN SATURATION: 96 % | HEIGHT: 68 IN

## 2022-07-21 DIAGNOSIS — S81.812A NONINFECTED SKIN TEAR OF LEG, LEFT, INITIAL ENCOUNTER: Primary | ICD-10-CM

## 2022-07-21 PROCEDURE — 99212 OFFICE O/P EST SF 10 MIN: CPT

## 2022-07-21 PROCEDURE — 12002 RPR S/N/AX/GEN/TRNK2.6-7.5CM: CPT

## 2022-07-21 RX ORDER — ACETAMINOPHEN 500 MG
500 TABLET ORAL EVERY 6 HOURS PRN
COMMUNITY

## 2022-07-21 RX ORDER — CEPHALEXIN 500 MG/1
500 CAPSULE ORAL 2 TIMES DAILY
Qty: 20 CAPSULE | Refills: 0 | Status: SHIPPED | OUTPATIENT
Start: 2022-07-21 | End: 2022-07-31

## 2022-07-21 ASSESSMENT — PAIN SCALES - GENERAL: PAINLEVEL_OUTOF10: 0

## 2022-07-21 ASSESSMENT — PAIN - FUNCTIONAL ASSESSMENT: PAIN_FUNCTIONAL_ASSESSMENT: NONE - DENIES PAIN

## 2022-07-21 NOTE — ED NOTES
Wound cleansed with sterile water. Triple antibiotic ointment and DSD applied.      KYLIE Garg  53/43/12 [id number]

## 2022-07-21 NOTE — ED PROVIDER NOTES
[address] [facility]  Department of Emergency Medicine  UC Encounter Note  22   11:17 AM EDT      NAME: Liza [place]  :  1935  MRN:  [id number]    Chief Complaint: Laceration (States he hit his leg with the car door last night. Has laceration/skin tear on left lower leg.)      This is an 61-year-old male the presents to urgent care complaining of a wound to his left shin area since last night. He states he accidentally hit his leg against a car door. He denies any bony pain. He thinks he has a skin tear to that leg. He denies any numbness or tingling or weakness. His last tetanus shot has been within 5 years. On first contact patient he appears to be in no acute distress. Review of Systems  Pertinent positives and negatives are stated within HPI, all other systems reviewed and are negative. Physical Exam  Vitals and nursing note reviewed. Constitutional:       Appearance: He is well-developed. HENT:      Head: Normocephalic and atraumatic. Jaw: No trismus. Right Ear: Hearing, tympanic membrane, ear canal and external ear normal.      Left Ear: Hearing, tympanic membrane, ear canal and external ear normal.      Nose: Nose normal.      Right Sinus: No maxillary sinus tenderness or frontal sinus tenderness. Left Sinus: No maxillary sinus tenderness or frontal sinus tenderness. Mouth/Throat:      Pharynx: Uvula midline. No uvula swelling. Eyes:      General: Lids are normal.      Conjunctiva/sclera: Conjunctivae normal.      Pupils: Pupils are equal, round, and reactive to light. Cardiovascular:      Rate and Rhythm: Normal rate and regular rhythm. Heart sounds: Normal heart sounds. No murmur heard. Pulmonary:      Effort: Pulmonary effort is normal.      Breath sounds: Normal breath sounds. Abdominal:      General: Bowel sounds are normal.      Palpations: Abdomen is soft. Abdomen is not rigid. Tenderness:  There is no abdominal fascia violation noted, no foreign bodies/material noted, no muscle damage noted, no nerve damage noted, no tendon damage noted, no underlying fracture noted and no vascular damage noted      Contaminated: no    Treatment:     Area cleansed with:  Saline    Amount of cleaning:  Standard    Irrigation solution:  Sterile saline    Debridement:  None  Skin repair:     Repair method:  Sutures    Suture size:  5-0    Suture material:  Nylon    Suture technique:  Simple interrupted    Number of sutures:  8  Approximation:     Approximation:  Loose  Repair type:     Repair type:  Simple  Post-procedure details:     Dressing:  Antibiotic ointment and non-adherent dressing    Procedure completion:  Tolerated    MDM  Number of Diagnoses or Management Options  Noninfected skin tear of leg, left, initial encounter  Diagnosis management comments: No acute distress. Wound care was discussed. Placed on antibiotic as well. Instructions given.             --------------------------------------------- PAST HISTORY ---------------------------------------------  Past Medical History:  has a past medical history of Arthritis, Oscar esophagus, Cancer ([facility] [address] Hypertension, Prostate irregularity, and Trigger finger, left ring finger. Past Surgical History:  has a past surgical history that includes hernia repair; Skin cancer excision; Leg Surgery (Left, 2011); Ankle Fusion (Left); Finger trigger release (Left, 05/21/2021); Lung surgery; and Bone Resection, Rib. Social History:  reports that he has never smoked. He has never used smokeless tobacco. He reports that he does not drink alcohol and does not use drugs. Family History: family history is not on file. The patients home medications have been reviewed. Allergies: Patient has no known allergies. -------------------------------------------------- RESULTS -------------------------------------------------  No results found for this visit on 07/21/22.   No orders to display       ------------------------- NURSING NOTES AND VITALS REVIEWED ---------------------------   The nursing notes within the ED encounter and vital signs as below have been reviewed. /72   Pulse 100   Temp 97.3 °F (36.3 °C) (Infrared)   Resp 20   Ht 5' 7.5\" (1.715 m)   Wt 168 lb (76.2 kg)   SpO2 96%   BMI 25.92 kg/m²   Oxygen Saturation Interpretation: Normal      ------------------------------------------ PROGRESS NOTES ------------------------------------------   I have spoken with the patient and discussed todays results, in addition to providing specific details for the plan of care and counseling regarding the diagnosis and prognosis. Their questions are answered at this time and they are agreeable with the plan.      --------------------------------- ADDITIONAL PROVIDER NOTES ---------------------------------     This patient is stable for discharge. I have shared the specific conditions for return, as well as the importance of follow-up. * NOTE: This report was transcribed using voice recognition software. Every effort was made to ensure accuracy; however, inadvertent computerized transcription errors may be present.    --------------------------------- IMPRESSION AND DISPOSITION ---------------------------------    IMPRESSION  1.  Noninfected skin tear of leg, left, initial encounter        DISPOSITION  Disposition: Discharge to home  Patient condition is good       Julia Wilson PA-C  07/21/22 1125

## 2022-07-28 ENCOUNTER — HOSPITAL ENCOUNTER (EMERGENCY)
Age: 87
Discharge: HOME | End: 2022-07-28
Payer: MEDICARE

## 2022-07-28 VITALS
BODY MASS INDEX: 25.46 KG/M2 | SYSTOLIC BLOOD PRESSURE: 131 MMHG | HEART RATE: 66 BPM | OXYGEN SATURATION: 99 % | RESPIRATION RATE: 18 BRPM | TEMPERATURE: 97.5 F | DIASTOLIC BLOOD PRESSURE: 65 MMHG | HEIGHT: 68 IN | WEIGHT: 168 LBS

## 2022-07-28 DIAGNOSIS — Z48.02 VISIT FOR SUTURE REMOVAL: Primary | ICD-10-CM

## 2022-07-28 PROCEDURE — 99211 OFF/OP EST MAY X REQ PHY/QHP: CPT

## 2022-07-28 ASSESSMENT — PAIN - FUNCTIONAL ASSESSMENT: PAIN_FUNCTIONAL_ASSESSMENT: 0-10

## 2022-07-28 ASSESSMENT — PAIN SCALES - GENERAL: PAINLEVEL_OUTOF10: 0

## 2022-07-28 NOTE — ED PROVIDER NOTES
[address] [facility]  Department of Emergency Medicine  UC Encounter Note  22   8:43 AM EDT      NAME: Anahi Moses  :  1935  MRN:  [id number]    Chief Complaint: Suture / Staple Removal (Had cut  left lower leg on car door has 8 stitches in it  here to get them removed)      This is an 68-year-old male the presents to urgent care for suture removal.  On 2022 he states he injured his leg on a car door. This resulted in 8 sutures being placed to his left shin. He states no problems and is still taking the rest of his antibiotics. On first contact patient he appears to be in no acute distress. Review of Systems  Pertinent positives and negatives are stated within HPI, all other systems reviewed and are negative. Physical Exam  Vitals and nursing note reviewed. Constitutional:       Appearance: He is well-developed. HENT:      Head: Normocephalic and atraumatic. Jaw: No trismus. Right Ear: Hearing and external ear normal.      Left Ear: Hearing and external ear normal.      Nose: Nose normal.      Right Sinus: No maxillary sinus tenderness or frontal sinus tenderness. Left Sinus: No maxillary sinus tenderness or frontal sinus tenderness. Mouth/Throat:      Pharynx: Uvula midline. No uvula swelling. Eyes:      General: Lids are normal.      Conjunctiva/sclera: Conjunctivae normal.      Pupils: Pupils are equal, round, and reactive to light. Cardiovascular:      Rate and Rhythm: Normal rate and regular rhythm. Heart sounds: Normal heart sounds. No murmur heard. Pulmonary:      Effort: Pulmonary effort is normal.      Breath sounds: Normal breath sounds. Abdominal:      General: Bowel sounds are normal.      Palpations: Abdomen is soft. Abdomen is not rigid. Tenderness: There is no abdominal tenderness. There is no guarding or rebound. Musculoskeletal:      Cervical back: Normal range of motion and neck supple.    Skin: General: Skin is warm and dry. Findings: Wound present. No abrasion or rash. Comments: Laceration/skin tear site to left lower shin is intact with 8 sutures. No active drainage or bleeding at this time no red streaking no swelling of the leg. No cyanosis. Neurological:      General: No focal deficit present. Mental Status: He is alert and oriented to person, place, and time. GCS: GCS eye subscore is 4. GCS verbal subscore is 5. GCS motor subscore is 6. Cranial Nerves: No cranial nerve deficit. Sensory: No sensory deficit. Coordination: Coordination normal.      Gait: Gait normal.       Suture Removal    Date/Time: 7/28/2022 9:00 AM  Performed by: Tyler Box PA-C  Authorized by: Tyler Box PA-C     Consent:     Consent obtained:  Verbal    Consent given by:  Patient    Risks, benefits, and alternatives were discussed: yes      Risks discussed:  Bleeding    Alternatives discussed:  No treatment  Universal protocol:     Procedure explained and questions answered to patient or proxy's satisfaction: yes      Patient identity confirmed:  Verbally with patient  Location:     Location:  Lower extremity    Lower extremity location:  Leg    Leg location:  L lower leg  Procedure details:     Wound appearance:  No signs of infection and good wound healing    Number of sutures removed:  8  Post-procedure details:     Post-removal:  Dressing applied and Steri-Strips applied (2)    Procedure completion:  Tolerated well, no immediate complications    MDM           --------------------------------------------- PAST HISTORY ---------------------------------------------  Past Medical History:  has a past medical history of Arthritis, Oscar esophagus, Cancer ([facility] [address] Hypertension, Prostate irregularity, and Trigger finger, left ring finger. Past Surgical History:  has a past surgical history that includes hernia repair; Skin cancer excision; Leg Surgery (Left, 2011);  Ankle Fusion (Left); Finger trigger release (Left, 05/21/2021); Lung surgery; and Bone Resection, Rib. Social History:  reports that he has never smoked. He has never used smokeless tobacco. He reports that he does not drink alcohol and does not use drugs. Family History: family history is not on file. The patients home medications have been reviewed. Allergies: Patient has no known allergies. -------------------------------------------------- RESULTS -------------------------------------------------  No results found for this visit on 07/28/22. No orders to display       ------------------------- NURSING NOTES AND VITALS REVIEWED ---------------------------   The nursing notes within the ED encounter and vital signs as below have been reviewed. /65   Pulse 66   Temp 97.5 °F (36.4 °C)   Resp 18   Ht 5' 8\" (1.727 m)   Wt 168 lb (76.2 kg)   SpO2 99%   BMI 25.54 kg/m²   Oxygen Saturation Interpretation: Normal      ------------------------------------------ PROGRESS NOTES ------------------------------------------   I have spoken with the patient and discussed todays results, in addition to providing specific details for the plan of care and counseling regarding the diagnosis and prognosis. Their questions are answered at this time and they are agreeable with the plan.      --------------------------------- ADDITIONAL PROVIDER NOTES ---------------------------------     This patient is stable for discharge. I have shared the specific conditions for return, as well as the importance of follow-up. * NOTE: This report was transcribed using voice recognition software. Every effort was made to ensure accuracy; however, inadvertent computerized transcription errors may be present.    --------------------------------- IMPRESSION AND DISPOSITION ---------------------------------    IMPRESSION  1.  Visit for suture removal        DISPOSITION  Disposition: Discharge to home  Patient condition is good       Jarod Schaefer PA-C  07/28/22 [id number]

## 2023-11-03 ENCOUNTER — OFFICE VISIT (OUTPATIENT)
Dept: DERMATOLOGY | Facility: CLINIC | Age: 88
End: 2023-11-03
Payer: MEDICARE

## 2023-11-03 DIAGNOSIS — L57.0 ACTINIC KERATOSIS: ICD-10-CM

## 2023-11-03 DIAGNOSIS — D48.5 NEOPLASM OF UNCERTAIN BEHAVIOR OF SKIN: Primary | ICD-10-CM

## 2023-11-03 PROCEDURE — 17000 DESTRUCT PREMALG LESION: CPT | Performed by: NURSE PRACTITIONER

## 2023-11-03 PROCEDURE — 11103 TANGNTL BX SKIN EA SEP/ADDL: CPT | Performed by: NURSE PRACTITIONER

## 2023-11-03 PROCEDURE — 88305 TISSUE EXAM BY PATHOLOGIST: CPT | Mod: TC,DER | Performed by: NURSE PRACTITIONER

## 2023-11-03 PROCEDURE — 88305 TISSUE EXAM BY PATHOLOGIST: CPT | Performed by: DERMATOLOGY

## 2023-11-03 PROCEDURE — 11102 TANGNTL BX SKIN SINGLE LES: CPT | Performed by: NURSE PRACTITIONER

## 2023-11-03 PROCEDURE — 17003 DESTRUCT PREMALG LES 2-14: CPT | Performed by: NURSE PRACTITIONER

## 2023-11-03 PROCEDURE — 1160F RVW MEDS BY RX/DR IN RCRD: CPT | Performed by: NURSE PRACTITIONER

## 2023-11-03 PROCEDURE — 99203 OFFICE O/P NEW LOW 30 MIN: CPT | Performed by: NURSE PRACTITIONER

## 2023-11-03 PROCEDURE — 1159F MED LIST DOCD IN RCRD: CPT | Performed by: NURSE PRACTITIONER

## 2023-11-03 NOTE — PROGRESS NOTES
Subjective     Meng Arriaza is a 88 y.o. 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     Review of Systems:  No other skin or systemic complaints other than what is documented elsewhere in the note.    The following portions of the chart were reviewed this encounter and updated as appropriate:         Skin Cancer History  No skin cancer on file.      Specialty Problems    None       Objective   Well appearing patient in no apparent distress; mood and affect are within normal limits.    A full examination was performed including scalp, head, eyes, ears, nose, lips, neck, chest, axillae, abdomen, back, buttocks, bilateral upper extremities, bilateral lower extremities, hands, feet, fingers, toes, fingernails, and toenails. All findings within normal limits unless otherwise noted below.    Assessment/Plan   1. Neoplasm of uncertain behavior of skin (4)  Mid Frontal Scalp          Lesion biopsy  Type of biopsy: tangential    Informed consent: discussed and consent obtained    Timeout: patient name, date of birth, surgical site, and procedure verified    Procedure prep:  Patient was prepped and draped  Anesthesia: the lesion was anesthetized in a standard fashion    Anesthetic:  1% lidocaine w/ epinephrine 1-100,000 local infiltration  Instrument used: DermaBlade    Hemostasis achieved with: aluminum chloride    Outcome: patient tolerated procedure well    Post-procedure details: sterile dressing applied and wound care instructions given    Dressing type: petrolatum and bandage      Staff Communication: Dermatology Local Anesthesia: 1 % Lidocaine / Epinephrine - Amount:2ml    Specimen 1 - Dermatopathology- DERM LAB  Differential Diagnosis: r/o nmsc  Check Margins Yes/No?:    Comments:    Dermpath Lab: Routine Histopathology (formalin-fixed tissue)    Mid Parietal Scalp          Lesion  biopsy  Type of biopsy: tangential    Informed consent: discussed and consent obtained    Timeout: patient name, date of birth, surgical site, and procedure verified    Procedure prep:  Patient was prepped and draped  Anesthesia: the lesion was anesthetized in a standard fashion    Anesthetic:  1% lidocaine w/ epinephrine 1-100,000 local infiltration  Instrument used: DermaBlade    Hemostasis achieved with: aluminum chloride    Outcome: patient tolerated procedure well    Post-procedure details: sterile dressing applied and wound care instructions given    Dressing type: petrolatum and bandage      Staff Communication: Dermatology Local Anesthesia: 1 % Lidocaine / Epinephrine - Amount:2ml    Specimen 2 - Dermatopathology- DERM LAB  Differential Diagnosis: r/o nmsc  Check Margins Yes/No?:    Comments:    Dermpath Lab: Routine Histopathology (formalin-fixed tissue)    Right Tip of Nose          Lesion biopsy  Type of biopsy: tangential    Informed consent: discussed and consent obtained    Timeout: patient name, date of birth, surgical site, and procedure verified    Procedure prep:  Patient was prepped and draped  Anesthesia: the lesion was anesthetized in a standard fashion    Anesthetic:  1% lidocaine w/ epinephrine 1-100,000 local infiltration  Instrument used: DermaBlade    Hemostasis achieved with: aluminum chloride    Outcome: patient tolerated procedure well    Post-procedure details: sterile dressing applied and wound care instructions given    Dressing type: petrolatum and bandage      Staff Communication: Dermatology Local Anesthesia: 1 % Lidocaine / Epinephrine - Amount:2ml    Specimen 3 - Dermatopathology- DERM LAB  Differential Diagnosis: r.o nmsc  Check Margins Yes/No?:    Comments:    Dermpath Lab: Routine Histopathology (formalin-fixed tissue)    Mid Supratip of Nose          Lesion biopsy  Type of biopsy: tangential    Informed consent: discussed and consent obtained    Timeout: patient name, date of  birth, surgical site, and procedure verified    Procedure prep:  Patient was prepped and draped  Anesthesia: the lesion was anesthetized in a standard fashion    Anesthetic:  1% lidocaine w/ epinephrine 1-100,000 local infiltration  Instrument used: DermaBlade    Hemostasis achieved with: aluminum chloride    Outcome: patient tolerated procedure well    Post-procedure details: sterile dressing applied and wound care instructions given    Dressing type: petrolatum and bandage      Staff Communication: Dermatology Local Anesthesia: 1 % Lidocaine / Epinephrine - Amount:2ml    Specimen 4 - Dermatopathology- DERM LAB  Differential Diagnosis: r/o nmsc  Check Margins Yes/No?:    Comments:    Dermpath Lab: Routine Histopathology (formalin-fixed tissue)    -  Discussed differential with patient.   - Given uncertainty of clinical diagnosis, a biopsy is recommended in clinic today.   - The patient expressed understanding, is in agreement with this plan, and wishes to proceed with biopsy.   - Oral and written wound care instructions provided.   - Advised the patient that the office will call within 2 weeks to discuss biopsy results.     Related Procedures  Follow Up In Dermatology - Established Patient    2. Actinic keratosis (11)  Left Anterior Neck, Left Ear, Left Forehead (2), Left Preauricular Area, Left Temple, Mid Forehead, Mid Frontal Scalp (2), Right Forehead, Right Temple  Firm scar like papules and scattered inflammatory papules and pustules    -Discussed nature of diagnosis and treatment options.   -Patient wishes to proceed with Cryotherapy today  -Possible side effects of liquid nitrogen treatment reviewed including formation of blisters, crusting, tenderness, scar, and discoloration which may be permanent.  -Patient advised to return the office for re-evaluation if the treated lesion(s) do not resolve within 4-6 weeks. Patient verbalizes understanding.    Destr of lesion - Left Anterior Neck, Left Ear, Left  Forehead (2), Left Preauricular Area, Left Temple, Mid Forehead, Mid Frontal Scalp (2), Right Forehead, Right Temple  Complexity: simple    Destruction method: cryotherapy    Informed consent: discussed and consent obtained    Lesion destroyed using liquid nitrogen: Yes    Cryotherapy cycles:  1  Outcome: patient tolerated procedure well with no complications    Post-procedure details: wound care instructions given

## 2023-11-07 LAB
LABORATORY COMMENT REPORT: NORMAL
PATH REPORT.FINAL DX SPEC: NORMAL
PATH REPORT.GROSS SPEC: NORMAL
PATH REPORT.MICROSCOPIC SPEC OTHER STN: NORMAL
PATH REPORT.RELEVANT HX SPEC: NORMAL
PATH REPORT.TOTAL CANCER: NORMAL

## 2023-11-13 DIAGNOSIS — C44.311 BASAL CELL CARCINOMA (BCC) OF SKIN OF NOSE: ICD-10-CM

## 2023-11-13 DIAGNOSIS — C44.92 SQUAMOUS CELL CARCINOMA OF SKIN: Primary | ICD-10-CM

## 2023-11-30 ENCOUNTER — OFFICE VISIT (OUTPATIENT)
Dept: DERMATOLOGY | Facility: CLINIC | Age: 88
End: 2023-11-30
Payer: MEDICARE

## 2023-11-30 DIAGNOSIS — L57.0 ACTINIC KERATOSIS: Primary | ICD-10-CM

## 2023-11-30 PROCEDURE — 1160F RVW MEDS BY RX/DR IN RCRD: CPT | Performed by: NURSE PRACTITIONER

## 2023-11-30 PROCEDURE — 1159F MED LIST DOCD IN RCRD: CPT | Performed by: NURSE PRACTITIONER

## 2023-11-30 PROCEDURE — 17000 DESTRUCT PREMALG LESION: CPT | Performed by: NURSE PRACTITIONER

## 2023-11-30 PROCEDURE — 17003 DESTRUCT PREMALG LES 2-14: CPT | Performed by: NURSE PRACTITIONER

## 2023-11-30 NOTE — PROGRESS NOTES
Subjective     Meng Arriaza is a 88 y.o. male who presents for the following: Skin Check (Presents for LN2 to scalp and nose AK(s), biopsy proven at last visit. No further complaints.).     Review of Systems:  No other skin or systemic complaints other than what is documented elsewhere in the note.    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    A full examination was performed including scalp, head, eyes, ears, nose, lips, neck, chest, axillae, abdomen, back, buttocks, bilateral upper extremities, bilateral lower extremities, hands, feet, fingers, toes, fingernails, and toenails. All findings within normal limits unless otherwise noted below.    Assessment/Plan   1. Actinic keratosis (10)  Left Parotid Area, Left Zygomatic Area, Mid Forehead (2), Mid Frontal Scalp (2), Mid Parietal Scalp (2), Right Parotid Area, Right [place]  Firm scar like papules and scattered inflammatory papules and pustules    -Discussed nature of diagnosis and treatment options.   -Patient wishes to proceed with Cryotherapy today  -Possible side effects of liquid nitrogen treatment reviewed including formation of blisters, crusting, tenderness, scar, and discoloration which may be permanent.  -Patient advised to return the office for re-evaluation if the treated lesion(s) do not resolve within 4-6 weeks. Patient verbalizes understanding.    Destr of lesion - Left Parotid Area, Left Zygomatic Area, Mid Forehead (2), Mid Frontal Scalp (2), Mid Parietal Scalp (2), Right Parotid Area, Right  Temple  Complexity: simple    Destruction method: cryotherapy    Informed consent: discussed and consent obtained    Lesion destroyed using liquid nitrogen: Yes    Cryotherapy cycles:  1  Outcome: patient tolerated procedure well with no complications    Post-procedure details: wound care instructions given

## 2024-01-22 ENCOUNTER — OFFICE VISIT (OUTPATIENT)
Dept: DERMATOLOGY | Facility: CLINIC | Age: 89
End: 2024-01-22
Payer: MEDICARE

## 2024-01-22 VITALS — DIASTOLIC BLOOD PRESSURE: 81 MMHG | SYSTOLIC BLOOD PRESSURE: 134 MMHG | HEART RATE: 78 BPM

## 2024-01-22 DIAGNOSIS — C44.311 BASAL CELL CARCINOMA (BCC) OF SKIN OF NOSE: ICD-10-CM

## 2024-01-22 PROCEDURE — 1159F MED LIST DOCD IN RCRD: CPT | Performed by: STUDENT IN AN ORGANIZED HEALTH CARE EDUCATION/TRAINING PROGRAM

## 2024-01-22 PROCEDURE — 17311 MOHS 1 STAGE H/N/HF/G: CPT | Performed by: STUDENT IN AN ORGANIZED HEALTH CARE EDUCATION/TRAINING PROGRAM

## 2024-01-22 PROCEDURE — 99214 OFFICE O/P EST MOD 30 MIN: CPT | Performed by: STUDENT IN AN ORGANIZED HEALTH CARE EDUCATION/TRAINING PROGRAM

## 2024-01-22 PROCEDURE — 17312 MOHS ADDL STAGE: CPT | Performed by: STUDENT IN AN ORGANIZED HEALTH CARE EDUCATION/TRAINING PROGRAM

## 2024-01-22 PROCEDURE — 1160F RVW MEDS BY RX/DR IN RCRD: CPT | Performed by: STUDENT IN AN ORGANIZED HEALTH CARE EDUCATION/TRAINING PROGRAM

## 2024-01-22 RX ORDER — CHOLECALCIFEROL (VITAMIN D3) 125 MCG
5000 CAPSULE ORAL
COMMUNITY

## 2024-01-22 NOTE — PROGRESS NOTES
Office Visit Note  Date: 1/22/2024  Surgeon:  Sina Landon MD  Office Location:  [address]  [facility] [address]  Dept: [phone]  Dept Fax: [phone]  Referring Provider: Susan L Mayne, APRN-CNP  [address] [facility] [address]  [place] [address]    Shasha Arriaza is a 88 y.o. male who presents for the following: MOHS Surgery    According to the patient, the lesion has been present for approximately 6 months at the time of diagnosis.  The lesion was painful and bleeding.  The lesion has not been treated previously.    The patient does not have a pacemaker / defibrillator.  The patient does not have a heart valve / joint replacement.    The patient is not on blood thinners.  The patient does not have a history of hepatitis B or C.  The patient does not have a history of HIV.  The patient does not have a history of immunosuppression (e.g. organ transplantation, malignancy, medications)    Review of Systems:  No other skin or systemic complaints other than what is documented elsewhere in the note.    MEDICAL HISTORY: clinically relevant history including significant past medical history, medications and allergies was reviewed and documented in Epic.    Objective   Well appearing patient in no apparent distress; mood and affect are within normal limits.  Vital signs: See record.  Noted on the Right Tip of Nose  Is a 0.5 x 0.5 cm scar      The patient confirmed the identified site.    Discussion:  The nature of the diagnosis was explained. The lesion is a skin cancer.  It has a risk of local growth and distant spread. The condition is associated with sun exposure.  Warning signs of non-melanoma skin cancer discussed. Patient was instructed to perform monthly self skin examination.  We recommended that the patient have regular full skin exams given an increased risk of subsequent skin cancers. The patient was instructed to use sun protective behaviors  including use of broad spectrum sunscreens and sun protective clothing to reduce risk of skin cancers.      Risks, benefits, side effects of Mohs surgery were discussed with patient and the patient voiced understanding.  It was explained that even though the cure rate of Mohs is very high it is not 100%. Risks of surgery including but not limited to bleeding, infection, numbness, nerve damage, and scar were reviewed.  Discussion included wound care requirements, activity restrictions, likely scar outcome and time to heal.     After Mohs surgery, the defect may need to be repaired surgically and the scar may be longer than the original lesion.  Reconstruction options, risks, and benefits were reviewed including second intention healing, linear repair (4-1 ratio was explained), local flaps, skin grafts, cartilage grafts and interpolation flaps (the need for multiple surgeries was explained). Possible outcomes were reviewed including likely scar appearance, failure of flap survival, infection, bleeding and the need for revision surgery.     The patient has a basal cell carcinoma. It was reviewed with the patient that the skin cancer has likely been present for >1 year and will continue to progress without treatment. The pathology was reviewed, the photograph was reviewed, and the referring physician's note was reviewed.    Medical Decision Making - moderate  Column 1 - chronic illness with progression  Column 3 - decision regarding minor surgery with identified risk factors (bleeding, infection, scarring). Moderate risk of morbidity from additional treatment - mohs surgery    Patient elected for Mohs surgery.

## 2024-01-22 NOTE — PROGRESS NOTES
Mohs Surgery Operative Note    Date of Surgery:  1/22/2024  Surgeon:  Sina Landon MD  Office Location: [address]  [facility] [address]  Dept: [phone]  Dept Fax: [phone]  Referring Provider: Susan L Mayne, APRN-CNP  [address] [facility] [address]  [place] [address]      Assessment/Plan   Pre-procedure:   Obtained informed consent: written from patient  The surgical site was identified and confirmed with the patient.     Intra-operative:   Audible time out called at : 8:21 AM 01/22/24  by: KELLI PHELAN RN   Verified patient name, birthdate, site, specimen bottle label & requisition.    The planned procedure(s) was again reviewed with the patient. The risks of bleeding, infection, nerve damage and scarring were reviewed. Written authorization was obtained. The patient identity, surgical site, and planned procedure(s) were verified. The provider acted as both surgeon and pathologist.     Basal cell carcinoma (BCC) of skin of nose  Right Tip of Nose    Mohs surgery    Consent obtained: written    Universal Protocol:  Procedure explained and questions answered to patient or proxy's satisfaction: Yes    Test results available and properly labeled: Yes    Pathology report reviewed: Yes    External notes reviewed: Yes    Photo or diagram used for site identification: Yes    Site/side marked: Yes    Slide independently reviewed by Mohs surgeon: Yes    Immediately prior to procedure a time out was called: Yes    Patient identity confirmed: verbally with patient  Preparation: Patient was prepped and draped in usual sterile fashion      Anticoagulation:  Is the patient taking prescription anticoagulant and/or aspirin prescribed/recommended by a physician? No    Was the anticoagulation regimen changed prior to Mohs? No      Anesthesia:  Anesthesia method: local infiltration  Local anesthetic: lidocaine 1% WITH epi    Procedure Details:  Biopsy accession  number: [id number]  Date of biopsy: 11/3/2023  Pre-Op diagnosis: basal cell carcinoma  Surgery side: right  Surgical site (from skin exam): Right Tip of Nose  Pre-operative length (cm): 0.5  Pre-operative width (cm): 0.5  Indications for Mohs surgery: anatomic location where tissue conservation is critical    Micrographic Surgery Details:  Post-operative length (cm): 0.8  Post-operative width (cm): 0.6  Number of Mohs stages: 2    Stage 1     Comments: The patient was brought into the operating room and placed in the procedure chair in the appropriate position.  The area positive by previous biopsy was identified and confirmed with the patient. The area of clinically obvious tumor was debulked using a curette and/or scalpel as needed. An incision was made following the Mohs approach through the skin. The specimen was taken to the lab, divided into 2 piece(s) and appropriately chromacoded and processed.    Tumor was seen on the lateral margins as indicated on the on the Mohs map.  Nodular basal cell carcinoma. Histologic examination revealed large tumor aggregates of atypical basaloid cells with peripheral palisading and tumoral clefting.              Tumor features identified on Mohs section: basal carcinoma      Depth of invasion: dermis    Stage 2     Comments: The area of positivity as noted on the Mohs map in the previous stage was identified and removed using the Mohs technique. The specimen was taken to the lab and appropriately chromacoded and processed in 1 piece(s).         Tumor features identified on Mohs section: no tumor identified    Depth of defect: subcutaneous fat    Patient tolerance of procedure: tolerated well, no immediate complications    Reconstruction:  Was the defect reconstructed?: No    Fine/surface layer approximation (top stitches)   Hemostasis achieved with: electrodesiccation  Outcome: patient tolerated procedure well with no complications    Post-procedure details: sterile dressing  applied and wound care instructions given      Staff Communication: Dermatology Local Anesthesia: 1 % Lidocaine / Epinephrine - Amount: 1.5cc      Repair: After a discussion with the patient regarding the options for wound closure, a decision was made to proceed with second intention healing. The patient understands the risks of alar rim notching and prefers not to undergo a more complex reconstruction (e.g. cartilage graft) to prevent notching.   Dressing F/U: Surgifoam was placed in the wound. A pressure dressing was placed to help stabilize the wound and to minimize the risk of postoperative bleeding. Wound care was discussed, and the patient was given written post-operative wound care instructions.             Wound care was discussed, and the patient was given written post-operative wound care instructions.      The patient will follow up with Sina Landon MD as needed for any post operative problems or concerns, and will follow up with their primary dermatologist as scheduled.

## 2024-01-29 ENCOUNTER — OFFICE VISIT (OUTPATIENT)
Dept: DERMATOLOGY | Facility: CLINIC | Age: 89
End: 2024-01-29
Payer: MEDICARE

## 2024-01-29 VITALS — SYSTOLIC BLOOD PRESSURE: 161 MMHG | HEART RATE: 73 BPM | DIASTOLIC BLOOD PRESSURE: 82 MMHG

## 2024-01-29 DIAGNOSIS — D04.4 SQUAMOUS CELL CARCINOMA IN SITU (SCCIS) OF SCALP: ICD-10-CM

## 2024-01-29 DIAGNOSIS — R23.4 ESCHAR: ICD-10-CM

## 2024-01-29 PROCEDURE — 1159F MED LIST DOCD IN RCRD: CPT | Performed by: STUDENT IN AN ORGANIZED HEALTH CARE EDUCATION/TRAINING PROGRAM

## 2024-01-29 PROCEDURE — 17311 MOHS 1 STAGE H/N/HF/G: CPT | Performed by: STUDENT IN AN ORGANIZED HEALTH CARE EDUCATION/TRAINING PROGRAM

## 2024-01-29 PROCEDURE — 17312 MOHS ADDL STAGE: CPT | Performed by: STUDENT IN AN ORGANIZED HEALTH CARE EDUCATION/TRAINING PROGRAM

## 2024-01-29 NOTE — PROGRESS NOTES
Office Visit Note  Date: 1/29/2024  Surgeon:  Sina Landon MD  Office Location:  [address]  [facility] [address]  Dept: [phone]  Dept Fax: [phone]  Referring Provider: Susan L Mayne, APRN-CNP  [address]  [place] [address]    Shasha Arriaza is a 88 y.o. male who presents for the following: MOHS Surgery (Mid Parietal Scalp, Squamous Cell Carcinoma) and Wound Check (Right Nasal Tip, 1 week status-post Mohs Surgery)    According to the patient, the lesion has been present for approximately 1 month at the time of diagnosis.  The lesion is itchy and scaling.  The lesion has not been treated previously.    The patient does not have a pacemaker / defibrillator.  The patient does not have a heart valve / joint replacement.    The patient is not on blood thinners.  The patient does not have a history of hepatitis B or C.  The patient does not have a history of HIV.  The patient does not have a history of immunosuppression (e.g. organ transplantation, malignancy, medications)    Review of Systems:  No other skin or systemic complaints other than what is documented elsewhere in the note.    MEDICAL HISTORY: clinically relevant history including significant past medical history, medications and allergies was reviewed and documented in Epic.    Objective   Well appearing patient in no apparent distress; mood and affect are within normal limits.  Vital signs: See record.  Noted on the Mid Parietal Scalp  Is a 1.3 x 1.0 cm scar                      Right Tip of Nose  eschar            The patient confirmed the identified site. The surgical site on the biopsy photo was also confirmed with Susan Mayne.     Discussion:  The nature of the diagnosis was explained. The lesion is a skin cancer.  It has a risk of local growth and distant spread. The condition is associated with sun exposure.  Warning signs of non-melanoma skin cancer discussed.  Patient was instructed to perform monthly self skin examination.  We recommended that the patient have regular full skin exams given an increased risk of subsequent skin cancers. The patient was instructed to use sun protective behaviors including use of broad spectrum sunscreens and sun protective clothing to reduce risk of skin cancers.      Risks, benefits, side effects of Mohs surgery were discussed with patient and the patient voiced understanding.  It was explained that even though the cure rate of Mohs is very high it is not 100%. Risks of surgery including but not limited to bleeding, infection, numbness, nerve damage, and scar were reviewed.  Discussion included wound care requirements, activity restrictions, likely scar outcome and time to heal.     After Mohs surgery, the defect may need to be repaired surgically and the scar may be longer than the original lesion.  Reconstruction options, risks, and benefits were reviewed including second intention healing, linear repair (4-1 ratio was explained), local flaps, skin grafts, cartilage grafts and interpolation flaps (the need for multiple surgeries was explained). Possible outcomes were reviewed including likely scar appearance, failure of flap survival, infection, bleeding and the need for revision surgery.     The pathology was reviewed, the photograph was reviewed, and the referring physician's note was reviewed.    Patient elected for Mohs surgery.

## 2024-01-29 NOTE — PROGRESS NOTES
Mohs Surgery Operative Note    Date of Surgery:  1/29/2024  Surgeon:  Sina Landon MD  Office Location: [address]  [place] [address]  Dept: [phone]  Dept Fax: [phone]  Referring Provider: Susan L Mayne, APRN-CNP  [address] [facility] [address]  [place] [address]      Assessment/Plan   Pre-procedure:   Obtained informed consent: written from patient  The surgical site was identified and confirmed with the patient. The surgical site on the biopsy photo was also confirmed with Susan Mayne.     Intra-operative:   Audible time out called at : 8:54 AM 01/29/24  by: Aida Bowers MA   Verified patient name, birthdate, site, specimen bottle label & requisition.    The planned procedure(s) was again reviewed with the patient. The risks of bleeding, infection, nerve damage and scarring were reviewed. Written authorization was obtained. The patient identity, surgical site, and planned procedure(s) were verified. The provider acted as both surgeon and pathologist.     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 Scalp    Mohs surgery    Consent obtained: written    Universal Protocol:  Procedure explained and questions answered to patient or proxy's satisfaction: Yes    Test results available and properly labeled: Yes    Pathology report reviewed: Yes    External notes reviewed: Yes    Photo or diagram used for site identification: Yes    Site/side marked: Yes    Slide independently reviewed by Mohs surgeon: Yes    Immediately prior to procedure a time out was called: Yes    Patient identity confirmed: verbally with patient  Preparation: Patient was prepped and draped in usual sterile fashion      Anticoagulation:  Is the patient taking prescription anticoagulant and/or aspirin prescribed/recommended by a physician? No    Was the anticoagulation regimen changed prior to Mohs? No      Anesthesia:  Anesthesia method: local infiltration  Local  anesthetic: lidocaine 1% WITH epi    Procedure Details:  Biopsy accession number: [id number]  Date of biopsy: 11/3/2023  Pre-Op diagnosis: squamous cell carcinoma  SCC subtype: in situ  Surgery side: midline  Surgical site (from skin exam): Mid Parietal Scalp  Pre-operative length (cm): 1.3  Pre-operative width (cm): 1  Indications for Mohs surgery: anatomic location where tissue conservation is critical and ill-defined borders  Previously treated? No      Micrographic Surgery Details:  Post-operative length (cm): 2.3  Post-operative width (cm): 1.8  Number of Mohs stages: 3    Stage 1     Comments: The patient was brought into the operating room and placed in the procedure chair in the appropriate position.  The area positive by previous biopsy was identified and confirmed with the patient. The area of clinically obvious tumor was debulked using a curette and/or scalpel as needed. An incision was made following the Mohs approach through the skin. The specimen was taken to the lab, divided into 2 piece(s) and appropriately chromacoded and processed.    Tumor was seen on the lateral margins as indicated on the on the Mohs map.  Histologic examination revealed enlarged, atypical keratinocytes with large nuclear to cytoplasmic ratio extending near full thickness of an epidermis suggestive of squamous cell carcinoma in situ although actinic keratosis is on the differential           Tumor features identified on Mohs section comment: squamous cell carcinoma in situ     Depth of invasion comment: epidermis    Stage 2     Comments: The area of positivity as noted on the Mohs map in the previous stage was identified and removed using the Mohs technique. The specimen was taken to the lab and appropriately chromacoded and processed in 2 piece(s).    Tumor was seen on the lateral margins as indicated on the on the Mohs map.  Histologic examination revealed enlarged, atypical keratinocytes with large nuclear to cytoplasmic  ratio extending near full thickness of an epidermis suggestive of squamous cell carcinoma in situ although actinic keratosis is on the differential.           Tumor features identified on Mohs section comment: squamous cell carcinoma in situ     Depth of invasion comment: epidermis    Stage 3     Comments: The area of positivity as noted on the Mohs map in the previous stage was identified and removed using the Mohs technique. The specimen was taken to the lab and appropriately chromacoded and processed in 1 piece(s).               Tumor features identified on Mohs section: no tumor identified    Depth of defect: subcutaneous fat    Patient tolerance of procedure: tolerated well, no immediate complications    Reconstruction:  Was the defect reconstructed?: No (Defect to repair by secondary intent.)    Fine/surface layer approximation (top stitches)   Hemostasis achieved with: pressure and electrodesiccation  Outcome: patient tolerated procedure well with no complications    Post-procedure details: sterile dressing applied and wound care instructions given    Dressing type: pressure dressing, petrolatum, Telfa pad and Hypafix      Staff Communication: Dermatology Local Anesthesia: Site Location: Mid Parietal Scalp 1 % Lidocaine / Epinephrine - Amount: 10.50 cc's    Eschar  Right Tip of Nose    Wound debrided by gauze saturated with H2O2 followed by light curettage. Continue daily soaks with H2O2 at home followed by dressing of petrolatum and adhesive bandage.     Follow up as scheduled for wound check in two (2) weeks.       Repair: After a discussion with the patient regarding the options for wound closure, a decision was made to proceed with second intention healing.  Dressing F/U: Surgifoam was placed in the wound. A pressure dressing was placed to help stabilize the wound and to minimize the risk of postoperative bleeding. Wound care was discussed, and the patient was given written post-operative wound care  instructions.    The patient will follow up with Sina Landon MD as needed for any post operative problems or concerns, and will follow up with their primary dermatologist as scheduled.

## 2024-02-02 ENCOUNTER — TELEPHONE (OUTPATIENT)
Dept: DERMATOLOGY | Facility: CLINIC | Age: 89
End: 2024-02-02
Payer: MEDICARE

## 2024-02-02 NOTE — TELEPHONE ENCOUNTER
Patient called and left voicemail stating he is still having some bleeding from his surgery site on his scalp that was performed this past Monday - I called him back twice in a row but it keeps going straight to voicemail - alternate contact on file has the same phone number.  I left him a message asking that he call us back right away to give us more information, and to please send us a photo to our [email or web address] email address. Awaiting call/email back.     Patient emailed us a photo and I called him back after Dr. Landon took a look, we let him know its looks good overall, and that he can expect to still see some oozing on the bandage for another week. He is aware to call us back if there are any changes. He voiced no other questions or concerns.

## 2024-02-06 ENCOUNTER — OFFICE VISIT (OUTPATIENT)
Dept: DERMATOLOGY | Facility: CLINIC | Age: 89
End: 2024-02-06
Payer: MEDICARE

## 2024-02-06 DIAGNOSIS — D48.5 NEOPLASM OF UNCERTAIN BEHAVIOR OF SKIN: ICD-10-CM

## 2024-02-06 DIAGNOSIS — Z12.83 SCREENING EXAM FOR SKIN CANCER: ICD-10-CM

## 2024-02-06 DIAGNOSIS — L81.4 LENTIGO: ICD-10-CM

## 2024-02-06 DIAGNOSIS — L57.0 ACTINIC KERATOSIS: Primary | ICD-10-CM

## 2024-02-06 PROCEDURE — 17003 DESTRUCT PREMALG LES 2-14: CPT | Performed by: NURSE PRACTITIONER

## 2024-02-06 PROCEDURE — 17000 DESTRUCT PREMALG LESION: CPT | Performed by: NURSE PRACTITIONER

## 2024-02-06 PROCEDURE — 1160F RVW MEDS BY RX/DR IN RCRD: CPT | Performed by: NURSE PRACTITIONER

## 2024-02-06 PROCEDURE — 99213 OFFICE O/P EST LOW 20 MIN: CPT | Performed by: NURSE PRACTITIONER

## 2024-02-06 PROCEDURE — 1159F MED LIST DOCD IN RCRD: CPT | Performed by: NURSE PRACTITIONER

## 2024-02-06 NOTE — PROGRESS NOTES
Subjective     Meng Arriaza is a 88 y.o. male who presents for the following: No chief complaint on file..     Review of Systems:  No other skin or systemic complaints other than what is documented elsewhere in the note.    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    A full examination was performed including scalp, head, eyes, ears, nose, lips, neck, chest, axillae, abdomen, back, buttocks, bilateral upper extremities, bilateral lower extremities, hands, feet, fingers, toes, fingernails, and toenails. All findings within normal limits unless otherwise noted below.    Assessment/Plan   1. Actinic keratosis (7)  Left Forehead, Left Occipital Scalp, Left Temple, Left Zygomatic Area, Right Parotid Area, Right Preauricular Area, Right Temple  Erythematous scaly macule(s)    -Discussed nature of diagnosis and treatment options.   -Patient wishes to proceed with Cryotherapy today  -Possible side effects of liquid nitrogen treatment reviewed including formation of blisters, crusting, tenderness, scar, and discoloration which may be permanent.  -Patient advised to return the office for re-evaluation if the treated lesion(s) do not resolve within 4-6 weeks. Patient verbalizes understanding.    Destr of lesion - Left Forehead, Left Occipital Scalp, Left Temple, Left Zygomatic Area, Right Parotid Area, Right Preauricular Area, Right Baptist  Complexity: simple    Destruction method: cryotherapy    Informed consent: discussed and consent obtained    Lesion destroyed using liquid  nitrogen: Yes    Cryotherapy cycles:  1  Outcome: patient tolerated procedure well with no complications    Post-procedure details: wound care instructions given      2. Neoplasm of uncertain behavior of skin    Related Procedures  Follow Up In Dermatology - Established Patient    3. Screening exam for skin cancer  Scattered benign lesions. Scar(s) clear no sign of recurrence.     No evidence of recurrence in scar and benign ROS.   Continue with regular total body skin exams.  ABCDEs of melanoma and atypical moles were discussed with the patient.  Patient was instructed to perform monthly self skin examination.  We recommended that the patient have regular full skin exams given an increased risk of subsequent skin cancers.  The patient was instructed to use sun protective behaviors including use of broad spectrum sunscreens and sun protective clothing to reduce risk of skin cancers.    4. Lentigo (3)  Head - Anterior (Face), Neck - Anterior, Scalp  Scattered tan macules in sun-exposed areas.    A solar lentigo (plural, solar lentigines), sometimes called an age spot or liver spot, is a brown macule (small, flat, smooth area of skin) caused by chronic sun or artificial ultraviolet (UV) light exposure. There may be just one lentigo or there may be multiple. This type of lentigo is different from lentigo simplex (discussed separately) because it is caused by exposure to UV light. Solar lentigines are benign, but they do indicate excessive sun exposure, a risk factor for the development of skin cancer.  Lesions are benign, no treatment needed.

## 2024-02-12 ENCOUNTER — OFFICE VISIT (OUTPATIENT)
Dept: DERMATOLOGY | Facility: CLINIC | Age: 89
End: 2024-02-12
Payer: MEDICARE

## 2024-02-12 DIAGNOSIS — Z85.828 HISTORY OF BASAL CELL CARCINOMA (BCC) OF SKIN: ICD-10-CM

## 2024-02-12 DIAGNOSIS — Z51.89 VISIT FOR WOUND CHECK: ICD-10-CM

## 2024-02-12 DIAGNOSIS — Z86.007 HISTORY OF SQUAMOUS CELL CARCINOMA IN SITU (SCCIS) OF SKIN: ICD-10-CM

## 2024-02-12 DIAGNOSIS — S01.00XA OPEN WOUND OF SCALP, UNSPECIFIED OPEN WOUND TYPE, INITIAL ENCOUNTER: Primary | ICD-10-CM

## 2024-02-12 PROCEDURE — 99212 OFFICE O/P EST SF 10 MIN: CPT | Performed by: STUDENT IN AN ORGANIZED HEALTH CARE EDUCATION/TRAINING PROGRAM

## 2024-02-12 PROCEDURE — 1160F RVW MEDS BY RX/DR IN RCRD: CPT | Performed by: STUDENT IN AN ORGANIZED HEALTH CARE EDUCATION/TRAINING PROGRAM

## 2024-02-12 PROCEDURE — 99024 POSTOP FOLLOW-UP VISIT: CPT | Performed by: STUDENT IN AN ORGANIZED HEALTH CARE EDUCATION/TRAINING PROGRAM

## 2024-02-12 PROCEDURE — 1159F MED LIST DOCD IN RCRD: CPT | Performed by: STUDENT IN AN ORGANIZED HEALTH CARE EDUCATION/TRAINING PROGRAM

## 2024-02-12 NOTE — PROGRESS NOTES
Office Follow Up Note    Visit Summary  Chief Complaint    1. Complaint Wound check.    Meng Arriaza is a 88 y.o. male who presents for 3 week follow up after surgery for a squamous cell carcinoma on the scalp. The wound was allowed to heal by second intention. The patient has no concerns today.     Location Operation site location: Mid Parietal Scalp    On exam,  Mr. Arriaza is well-appearing and in no apparent distress. The surgical site appears clean with minimal to no erythema. No tenderness. Centrally, there is appropriate granulation but with overlying crust.       1. Visit for wound check  Mid Parietal Scalp        Assessment and Plan:  History of skin cancer requiring ongoing monitoring for recurrence and additional lesion development.   The patient was reassured that the wound is healing appropriately.   The patient was advised to continue wound care with vaseline and bandaging for 2 weeks.  The dressing was removed, the wound cleaned a a new dressing reapplied.     The patient was advised on the importance of sun protection and routine skin monitoring and instructed to call with any further concerns. The patient will return as needed.

## 2024-06-17 ASSESSMENT — PATIENT GLOBAL ASSESSMENT (PGA): WHAT IS THE PGA: PATIENT GLOBAL ASSESSMENT:  2 - MILD

## 2024-06-17 ASSESSMENT — DERMATOLOGY QUALITY OF LIFE (QOL) ASSESSMENT
RATE HOW EMOTIONALLY BOTHERED YOU ARE BY YOUR SKIN PROBLEM (FOR EXAMPLE, WORRY, EMBARRASSMENT, FRUSTRATION): 0 - NEVER BOTHERED
RATE HOW BOTHERED YOU ARE BY EFFECTS OF YOUR SKIN PROBLEMS ON YOUR ACTIVITIES (EG, GOING OUT, ACCOMPLISHING WHAT YOU WANT, WORK ACTIVITIES OR YOUR RELATIONSHIPS WITH OTHERS): 0 - NEVER BOTHERED
RATE HOW BOTHERED YOU ARE BY EFFECTS OF YOUR SKIN PROBLEMS ON YOUR ACTIVITIES (EG, GOING OUT, ACCOMPLISHING WHAT YOU WANT, WORK ACTIVITIES OR YOUR RELATIONSHIPS WITH OTHERS): 0 - NEVER BOTHERED
WHAT SINGLE SKIN CONDITION LISTED BELOW IS THE PATIENT ANSWERING THE QUALITY-OF-LIFE ASSESSMENT QUESTIONS ABOUT: NONE OF THE ABOVE
WHAT SINGLE SKIN CONDITION LISTED BELOW IS THE PATIENT ANSWERING THE QUALITY-OF-LIFE ASSESSMENT QUESTIONS ABOUT: NONE OF THE ABOVE
RATE HOW BOTHERED YOU ARE BY SYMPTOMS OF YOUR SKIN PROBLEM (EG, ITCHING, STINGING BURNING, HURTING OR SKIN IRRITATION): 2
RATE HOW BOTHERED YOU ARE BY SYMPTOMS OF YOUR SKIN PROBLEM (EG, ITCHING, STINGING BURNING, HURTING OR SKIN IRRITATION): 2
RATE HOW EMOTIONALLY BOTHERED YOU ARE BY YOUR SKIN PROBLEM (FOR EXAMPLE, WORRY, EMBARRASSMENT, FRUSTRATION): 0 - NEVER BOTHERED

## 2024-06-19 ENCOUNTER — APPOINTMENT (OUTPATIENT)
Dept: DERMATOLOGY | Facility: CLINIC | Age: 89
End: 2024-06-19
Payer: MEDICARE

## 2024-06-19 DIAGNOSIS — D18.01 HEMANGIOMA OF SKIN: ICD-10-CM

## 2024-06-19 DIAGNOSIS — L82.1 SEBORRHEIC KERATOSIS: ICD-10-CM

## 2024-06-19 DIAGNOSIS — L57.0 ACTINIC KERATOSIS: ICD-10-CM

## 2024-06-19 DIAGNOSIS — D49.2 NEOPLASM OF SKIN: ICD-10-CM

## 2024-06-19 DIAGNOSIS — L81.4 LENTIGO: ICD-10-CM

## 2024-06-19 DIAGNOSIS — Z12.83 SCREENING EXAM FOR SKIN CANCER: Primary | ICD-10-CM

## 2024-06-19 PROCEDURE — 1159F MED LIST DOCD IN RCRD: CPT | Performed by: NURSE PRACTITIONER

## 2024-06-19 PROCEDURE — 1160F RVW MEDS BY RX/DR IN RCRD: CPT | Performed by: NURSE PRACTITIONER

## 2024-06-19 PROCEDURE — 99213 OFFICE O/P EST LOW 20 MIN: CPT | Performed by: NURSE PRACTITIONER

## 2024-06-19 PROCEDURE — 17000 DESTRUCT PREMALG LESION: CPT | Performed by: NURSE PRACTITIONER

## 2024-06-19 PROCEDURE — 17003 DESTRUCT PREMALG LES 2-14: CPT | Performed by: NURSE PRACTITIONER

## 2024-06-19 RX ORDER — CARVEDILOL 3.12 MG/1
1 TABLET ORAL 2 TIMES DAILY
COMMUNITY

## 2024-06-19 RX ORDER — ROSUVASTATIN CALCIUM 20 MG/1
1 TABLET, COATED ORAL NIGHTLY
COMMUNITY
Start: 2024-05-30

## 2024-06-19 RX ORDER — DIAZEPAM 2 MG/1
TABLET ORAL
COMMUNITY

## 2024-06-19 RX ORDER — LOSARTAN POTASSIUM 50 MG/1
1 TABLET ORAL
COMMUNITY
Start: 2024-04-15

## 2024-06-19 NOTE — PROGRESS NOTES
Subjective     Meng Arriaza is a 88 y.o. male who presents for the following: Skin Check (Patient presents in office today for waist-up skin exam. PMHX NMSC. Has upcoming cardiac stent procedure 6.25.24. No complaints./ ).     Review of Systems:  No other skin or systemic complaints other than what is documented elsewhere in the note.    The following portions of the chart were reviewed this encounter and updated as appropriate:   Tobacco  Allergies  Meds  Problems  Med Hx  Surg Hx  Fam Hx         Skin Cancer History  Biopsy Date Type Location Status   11/3/23 BCC Right Tip of Nose Treatment Complete  2/12/24   11/3/23 SCC in Situ Mid Parietal Scalp Treatment Complete  2/12/24       Specialty Problems    None       Objective   Well appearing patient in no apparent distress; mood and affect are within normal limits.    A focused skin examination was performed. All findings within normal limits unless otherwise noted below.    Assessment/Plan   1. Screening exam for skin cancer  Scattered benign lesions. Scar(s) clear no sign of recurrence.     No evidence of recurrence in scar and benign ROS.   Continue with regular total body skin exams.  ABCDEs of melanoma and atypical moles were discussed with the patient.  Patient was instructed to perform monthly self skin examination.  We recommended that the patient have regular full skin exams given an increased risk of subsequent skin cancers.  The patient was instructed to use sun protective behaviors including use of broad spectrum sunscreens and sun protective clothing to reduce risk of skin cancers.    2. Seborrheic keratosis  Stuck on verrucous, tan-brown papules and plaques.      Although Seborrheic Keratoses can be troublesome and unsightly, they are entirely benign.  Removal of Seborrheic Keratoses is considered a cosmetic procedure. Removal is typically performed using liquid nitrogen cryotherapy.  Treatment of current lesions does not prevent the development of  new Seborrheic Keratoses in the future.    3. Hemangioma of skin  Violaceous/red papule with maroon lagoons     - A cherry hemangioma is a small macule (small, flat, smooth area) or papule (small, solid bump) formed from an overgrowth of tiny blood vessels in the skin. Cherry hemangiomas are characteristically red or purplish in color. They often first appear in middle adulthood and usually increase in number with age. Cherry hemangiomas are noncancerous (benign) and are common in adults.  - Lesions are benign, reassured patient.     4. Lentigo  Scattered tan macules in sun-exposed areas.    A solar lentigo (plural, solar lentigines), sometimes called an age spot or liver spot, is a brown macule (small, flat, smooth area of skin) caused by chronic sun or artificial ultraviolet (UV) light exposure. There may be just one lentigo or there may be multiple. This type of lentigo is different from lentigo simplex (discussed separately) because it is caused by exposure to UV light. Solar lentigines are benign, but they do indicate excessive sun exposure, a risk factor for the development of skin cancer.  Lesions are benign, no treatment needed.     5. Actinic keratosis (5)  Left Buccal Cheek, Left Liberty, Mid Parietal Scalp (2), Right Forehead  Erythematous scaly macule(s)    -Discussed nature of diagnosis and treatment options.   -Patient wishes to proceed with Cryotherapy today  -Possible side effects of liquid nitrogen treatment reviewed including formation of blisters, crusting, tenderness, scar, and discoloration which may be permanent.  -Patient advised to return the office for re-evaluation if the treated lesion(s) do not resolve within 4-6 weeks. Patient verbalizes understanding.    Destr of lesion - Left Buccal Cheek, Left Liberty, Mid Parietal Scalp (2), Right Forehead  Complexity: simple    Destruction method: cryotherapy    Informed consent: discussed and consent obtained    Lesion destroyed using liquid  nitrogen: Yes    Cryotherapy cycles:  1  Outcome: patient tolerated procedure well with no complications    Post-procedure details: wound care instructions given      6. Neoplasm of skin  Left Tip of Nose  5mm pearly, pink papule with arborizing telangiectasia.     -  Discussed differential with patient.   - Given uncertainty of clinical diagnosis, a biopsy is recommended in clinic today.   - The patient expressed understanding, is in agreement with this plan, but would like to defer until after stent placement.   - Will return in 2 months for biopsy.       Return in 1-2 months for biopsy.

## 2024-07-16 ENCOUNTER — HOSPITAL ENCOUNTER (OUTPATIENT)
Dept: CARDIAC REHAB | Age: 89
Setting detail: THERAPIES SERIES
Discharge: HOME | End: 2024-07-16
Payer: MEDICARE

## 2024-07-16 PROCEDURE — 93798 PHYS/QHP OP CAR RHAB W/ECG: CPT

## 2024-07-16 PROCEDURE — 93797 PHYS/QHP OP CAR RHAB WO ECG: CPT

## 2024-07-16 RX ORDER — CARVEDILOL 3.12 MG/1
3.12 TABLET ORAL 2 TIMES DAILY WITH MEALS
COMMUNITY

## 2024-07-16 RX ORDER — CLOPIDOGREL BISULFATE 75 MG/1
75 TABLET ORAL DAILY
COMMUNITY

## 2024-07-16 RX ORDER — ASPIRIN 81 MG/1
81 TABLET ORAL DAILY
COMMUNITY

## 2024-07-16 RX ORDER — ROSUVASTATIN CALCIUM 20 MG/1
20 TABLET, COATED ORAL DAILY
COMMUNITY

## 2024-07-16 ASSESSMENT — EXERCISE STRESS TEST: PEAK_BP: 140/82

## 2024-07-16 ASSESSMENT — LIFESTYLE VARIABLES: SMOKELESS_TOBACCO: NO

## 2024-07-16 ASSESSMENT — PATIENT HEALTH QUESTIONNAIRE - PHQ9
SUM OF ALL RESPONSES TO PHQ QUESTIONS 1-9: 0
8. MOVING OR SPEAKING SO SLOWLY THAT OTHER PEOPLE COULD HAVE NOTICED. OR THE OPPOSITE, BEING SO FIGETY OR RESTLESS THAT YOU HAVE BEEN MOVING AROUND A LOT MORE THAN USUAL: NOT AT ALL
SUM OF ALL RESPONSES TO PHQ QUESTIONS 1-9: 0
9. THOUGHTS THAT YOU WOULD BE BETTER OFF DEAD, OR OF HURTING YOURSELF: NOT AT ALL
SUM OF ALL RESPONSES TO PHQ QUESTIONS 1-9: 0
6. FEELING BAD ABOUT YOURSELF - OR THAT YOU ARE A FAILURE OR HAVE LET YOURSELF OR YOUR FAMILY DOWN: NOT AT ALL
1. LITTLE INTEREST OR PLEASURE IN DOING THINGS: NOT AT ALL
5. POOR APPETITE OR OVEREATING: NOT AT ALL
2. FEELING DOWN, DEPRESSED OR HOPELESS: NOT AT ALL
7. TROUBLE CONCENTRATING ON THINGS, SUCH AS READING THE NEWSPAPER OR WATCHING TELEVISION: NOT AT ALL
SUM OF ALL RESPONSES TO PHQ9 QUESTIONS 1 & 2: 0
10. IF YOU CHECKED OFF ANY PROBLEMS, HOW DIFFICULT HAVE THESE PROBLEMS MADE IT FOR YOU TO DO YOUR WORK, TAKE CARE OF THINGS AT HOME, OR GET ALONG WITH OTHER PEOPLE: NOT DIFFICULT AT ALL
SUM OF ALL RESPONSES TO PHQ QUESTIONS 1-9: 0
4. FEELING TIRED OR HAVING LITTLE ENERGY: NOT AT ALL
3. TROUBLE FALLING OR STAYING ASLEEP: NOT AT ALL

## 2024-07-16 ASSESSMENT — EJECTION FRACTION: EF_VALUE: 45

## 2024-07-16 NOTE — PROGRESS NOTES
Exercise Goals To get stronger and be able to do ADLs easier   Psychosocial   Stages of Change Maintenance   PHQ-9 Total Score 0   Psychosocial Intervention   Interventions No intervention indicated   Currently Taking Psychotropic Meds No   Medication Changes   (New Pt)   Psychosocial Education   Education Advanced directives;Benefits of CPR completion;Cardiac meds   Psychosocial Target Goals   Target Goal(s) Assess presence or absence of depression using a valid screening tool   Uses Stress Mgmt Techniques No   Tobacco   Stages of Change Other (comment)  (Never a user)   Tobacco Use No   Smokeless Tobacco Use No   Target Goal   Target Goal   (Maintain tobacco free)   Nutrition   Stages of Change Action   Diabetes No   Lipids   Date Lipids Drawn 06/06/24   Total 140   HDL 39   LDL 89   Triglycerides 60   Lipid Med(s) CRESTOR   Lipid Med Change(s) No   Weight Management   Rate Your Plate Total Score 61   Alcohol None   Weight  79.3 kg (174 lb 14.4 oz)   Height  1.753 m (5' 9\")   BMI 25.88   Nutrition Intervention   Dietitian Consult   (Will schedule with staff dieitian)   Nutrition Education   Education Low saturated fat diet;Low sodium diet   Education   Learning Barrier Ready to learn   Cardiac Knowledge Total Score 9   Education Intervention   Education Schedule Given Yes   Patient Education    Education Risk factors;Med compliance;Signs/Symptoms of angina   Hypertension Yes   Hypertension Controlled Yes   Is BP WDL Yes   Med(s) Change   (New pt)   BP Meds Carvedilol 3.125mg TID   ACE/ARB Prescribed No   ASA Prescribed Yes  (Aspirin 81mg)   ASA Adherent Yes   Antiplatelet Prescribed Yes  (PLAVIX 75mg)   Antiplatelet Adherent Yes   BB Prescribed Yes  (Carvedilol 3.125mg TID)   BB Adherent Yes   Statins Prescribed Yes  (CRESTOR 20mg)   Statins Adherent Yes   Statin Intensity High   Education Target Goals   Target Goals Medication compliance;Risk factors;Understand target guidelines for lipids;Understand target

## 2024-07-19 ENCOUNTER — HOSPITAL ENCOUNTER (OUTPATIENT)
Dept: CARDIAC REHAB | Age: 89
Setting detail: THERAPIES SERIES
Discharge: HOME | End: 2024-07-19
Payer: MEDICARE

## 2024-07-19 ENCOUNTER — APPOINTMENT (OUTPATIENT)
Dept: CARDIAC REHAB | Age: 89
End: 2024-07-19
Payer: MEDICARE

## 2024-07-19 PROCEDURE — 93798 PHYS/QHP OP CAR RHAB W/ECG: CPT

## 2024-07-20 ENCOUNTER — HOSPITAL ENCOUNTER (EMERGENCY)
Age: 89
Discharge: HOME | End: 2024-07-20
Payer: MEDICARE

## 2024-07-20 VITALS
RESPIRATION RATE: 18 BRPM | WEIGHT: 175 LBS | BODY MASS INDEX: 26.61 KG/M2 | HEART RATE: 65 BPM | DIASTOLIC BLOOD PRESSURE: 78 MMHG | SYSTOLIC BLOOD PRESSURE: 155 MMHG | TEMPERATURE: 97.5 F | OXYGEN SATURATION: 97 %
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
## 2024-07-20 PROCEDURE — 6370000000 HC RX 637 (ALT 250 FOR IP): Performed by: NURSE PRACTITIONER

## 2024-07-20 PROCEDURE — 99211 OFF/OP EST MAY X REQ PHY/QHP: CPT

## 2024-07-20 RX ORDER — DOXYCYCLINE HYCLATE 100 MG/1
200 CAPSULE ORAL ONCE
Status: COMPLETED | OUTPATIENT
Start: 2024-07-20 | End: 2024-07-20

## 2024-07-20 RX ADMIN — DOXYCYCLINE HYCLATE 200 MG: 100 CAPSULE ORAL at 20:10

## 2024-07-20 ASSESSMENT — PAIN - FUNCTIONAL ASSESSMENT: PAIN_FUNCTIONAL_ASSESSMENT: NONE - DENIES PAIN

## 2024-07-21 NOTE — ED PROVIDER NOTES
to ensure accuracy; however, inadvertent computerized transcription errors may be present.  END OF ED PROVIDER NOTE       Sam Jones, APRN - CNP  07/21/24 0831

## 2024-07-24 ENCOUNTER — APPOINTMENT (OUTPATIENT)
Dept: CARDIAC REHAB | Age: 89
End: 2024-07-24
Payer: MEDICARE

## 2024-07-24 ENCOUNTER — HOSPITAL ENCOUNTER (OUTPATIENT)
Dept: CARDIAC REHAB | Age: 89
Setting detail: THERAPIES SERIES
Discharge: HOME | End: 2024-07-24
Payer: MEDICARE

## 2024-07-24 PROCEDURE — 93798 PHYS/QHP OP CAR RHAB W/ECG: CPT

## 2024-07-26 ENCOUNTER — APPOINTMENT (OUTPATIENT)
Dept: CARDIAC REHAB | Age: 89
End: 2024-07-26
Payer: MEDICARE

## 2024-07-26 ENCOUNTER — HOSPITAL ENCOUNTER (OUTPATIENT)
Dept: CARDIAC REHAB | Age: 89
Setting detail: THERAPIES SERIES
Discharge: HOME | End: 2024-07-26
Payer: MEDICARE

## 2024-07-26 PROCEDURE — 93798 PHYS/QHP OP CAR RHAB W/ECG: CPT

## 2024-07-29 ENCOUNTER — HOSPITAL ENCOUNTER (OUTPATIENT)
Dept: CARDIAC REHAB | Age: 89
Setting detail: THERAPIES SERIES
Discharge: HOME | End: 2024-07-29
Payer: MEDICARE

## 2024-07-29 ENCOUNTER — APPOINTMENT (OUTPATIENT)
Dept: CARDIAC REHAB | Age: 89
End: 2024-07-29
Payer: MEDICARE

## 2024-07-29 PROCEDURE — 93798 PHYS/QHP OP CAR RHAB W/ECG: CPT

## 2024-07-31 ENCOUNTER — HOSPITAL ENCOUNTER (OUTPATIENT)
Dept: CARDIAC REHAB | Age: 89
Setting detail: THERAPIES SERIES
Discharge: HOME | End: 2024-07-31
Payer: MEDICARE

## 2024-07-31 ENCOUNTER — APPOINTMENT (OUTPATIENT)
Dept: CARDIAC REHAB | Age: 89
End: 2024-07-31
Payer: MEDICARE

## 2024-07-31 PROCEDURE — 93798 PHYS/QHP OP CAR RHAB W/ECG: CPT

## 2024-08-02 ENCOUNTER — APPOINTMENT (OUTPATIENT)
Dept: CARDIAC REHAB | Age: 89
End: 2024-08-02
Payer: MEDICARE

## 2024-08-05 ENCOUNTER — HOSPITAL ENCOUNTER (OUTPATIENT)
Dept: CARDIAC REHAB | Age: 89
Setting detail: THERAPIES SERIES
Discharge: HOME | End: 2024-08-05
Payer: MEDICARE

## 2024-08-05 PROCEDURE — 93798 PHYS/QHP OP CAR RHAB W/ECG: CPT

## 2024-08-07 ENCOUNTER — APPOINTMENT (OUTPATIENT)
Dept: CARDIAC REHAB | Age: 89
End: 2024-08-07
Payer: MEDICARE

## 2024-08-09 ENCOUNTER — HOSPITAL ENCOUNTER (OUTPATIENT)
Dept: CARDIAC REHAB | Age: 89
Setting detail: THERAPIES SERIES
Discharge: HOME | End: 2024-08-09
Payer: MEDICARE

## 2024-08-09 PROCEDURE — 93798 PHYS/QHP OP CAR RHAB W/ECG: CPT

## 2024-08-12 ENCOUNTER — APPOINTMENT (OUTPATIENT)
Dept: CARDIAC REHAB | Age: 89
End: 2024-08-12
Payer: MEDICARE

## 2024-08-14 ENCOUNTER — APPOINTMENT (OUTPATIENT)
Dept: CARDIAC REHAB | Age: 89
End: 2024-08-14
Payer: MEDICARE

## 2024-08-16 ENCOUNTER — APPOINTMENT (OUTPATIENT)
Dept: CARDIAC REHAB | Age: 89
End: 2024-08-16
Payer: MEDICARE

## 2024-08-19 ENCOUNTER — HOSPITAL ENCOUNTER (EMERGENCY)
Age: 89
Discharge: HOME | End: 2024-08-19
Payer: MEDICARE

## 2024-08-19 ENCOUNTER — APPOINTMENT (OUTPATIENT)
Dept: CARDIAC REHAB | Age: 89
End: 2024-08-19
Payer: MEDICARE

## 2024-08-19 ENCOUNTER — APPOINTMENT (OUTPATIENT)
Dept: GENERAL RADIOLOGY | Age: 89
End: 2024-08-19
Payer: MEDICARE

## 2024-08-19 ENCOUNTER — APPOINTMENT (OUTPATIENT)
Dept: DERMATOLOGY | Facility: CLINIC | Age: 89
End: 2024-08-19
Payer: MEDICARE

## 2024-08-19 ENCOUNTER — APPOINTMENT (OUTPATIENT)
Dept: CT IMAGING | Age: 89
End: 2024-08-19
Payer: MEDICARE

## 2024-08-19 VITALS
WEIGHT: 170 LBS | TEMPERATURE: 97.4 F | HEART RATE: 58 BPM | SYSTOLIC BLOOD PRESSURE: 171 MMHG | OXYGEN SATURATION: 98 % | RESPIRATION RATE: 18 BRPM | DIASTOLIC BLOOD PRESSURE: 90 MMHG | BODY MASS INDEX: 25.85 KG/M2

## 2024-08-19 DIAGNOSIS — S09.90XA CLOSED HEAD INJURY, INITIAL ENCOUNTER: Primary | ICD-10-CM

## 2024-08-19 DIAGNOSIS — S49.91XA INJURY OF RIGHT SHOULDER, INITIAL ENCOUNTER: ICD-10-CM

## 2024-08-19 DIAGNOSIS — R07.81 RIB PAIN: ICD-10-CM

## 2024-08-19 DIAGNOSIS — L73.8 SEBACEOUS HYPERPLASIA OF FACE: Primary | ICD-10-CM

## 2024-08-19 DIAGNOSIS — S50.11XA CONTUSION OF RIGHT FOREARM, INITIAL ENCOUNTER: ICD-10-CM

## 2024-08-19 DIAGNOSIS — L81.4 LENTIGO: ICD-10-CM

## 2024-08-19 DIAGNOSIS — S00.81XA ABRASION, FACE W/O INFECTION: ICD-10-CM
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
## 2024-08-19 DIAGNOSIS — L82.1 SEBORRHEIC KERATOSIS: ICD-10-CM

## 2024-08-19 PROCEDURE — 1159F MED LIST DOCD IN RCRD: CPT | Performed by: NURSE PRACTITIONER

## 2024-08-19 PROCEDURE — 73030 X-RAY EXAM OF SHOULDER: CPT

## 2024-08-19 PROCEDURE — 73090 X-RAY EXAM OF FOREARM: CPT

## 2024-08-19 PROCEDURE — 71250 CT THORAX DX C-: CPT

## 2024-08-19 PROCEDURE — 1160F RVW MEDS BY RX/DR IN RCRD: CPT | Performed by: NURSE PRACTITIONER

## 2024-08-19 PROCEDURE — 99213 OFFICE O/P EST LOW 20 MIN: CPT | Performed by: NURSE PRACTITIONER

## 2024-08-19 PROCEDURE — 99284 EMERGENCY DEPT VISIT MOD MDM: CPT

## 2024-08-19 PROCEDURE — 70450 CT HEAD/BRAIN W/O DYE: CPT

## 2024-08-19 PROCEDURE — 72125 CT NECK SPINE W/O DYE: CPT

## 2024-08-19 RX ORDER — ACETAMINOPHEN 500 MG
1000 TABLET ORAL ONCE
Status: DISCONTINUED | OUTPATIENT
Start: 2024-08-19 | End: 2024-08-19 | Stop reason: HOSPADM

## 2024-08-19 ASSESSMENT — PAIN - FUNCTIONAL ASSESSMENT: PAIN_FUNCTIONAL_ASSESSMENT: 0-10

## 2024-08-19 ASSESSMENT — PAIN SCALES - GENERAL: PAINLEVEL_OUTOF10: 8

## 2024-08-19 ASSESSMENT — PAIN DESCRIPTION - LOCATION: LOCATION: ARM

## 2024-08-19 ASSESSMENT — LIFESTYLE VARIABLES: HOW OFTEN DO YOU HAVE A DRINK CONTAINING ALCOHOL: NEVER

## 2024-08-19 ASSESSMENT — PAIN DESCRIPTION - ORIENTATION: ORIENTATION: RIGHT

## 2024-08-19 NOTE — ED PROVIDER NOTES
Independent NOEMI Visit.       [facility]  Department of Emergency Medicine   ED  Encounter Note  Admit Date/RoomTime: 2024 11:59 AM  ED Room:     NAME: Vince Cobian  : 1935  MRN: [id number]     Chief Complaint:  Fall (Tripped over ramp, RUE pain )    History of Present Illness       Vince Cobian is a 88 y.o. old male who presents to the emergency department by private vehicle, for a mechanical fall which occured 1 hour(s) prior to arrival. He reportedly tripped over a ramp trying to get to ramps moving for a motorized vehicle while at home prior to arrival.  Patient reports that he landed on the ramp with his right side of his body.  He reports that he is having some facial pain, right shoulder pain and right forearm pain.  He also reports that he landed on the right side of his ribs.  He states that he has some discomfort but denies chest pain shortness of breath or abdominal pain.  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   The patients tetanus status is within past 10 years.   Since onset the symptoms have been unchanged.  His pain is aggraveated by certain movements or pressure on or palpation of painful area and relieved by rest of injured area.  He denies any loss of consciousness, confusion, dizziness, neck pain, chest pain, abdominal pain, back pain, numbness, weakness, blurred vision, nausea, vomiting, fever, or chills.  He takes Plavix.  .  ROS   Pertinent positives and negatives are stated within HPI, all other systems reviewed and are negative.    Past Medical History:  has a past medical history of Arthritis, Oscar esophagus, Cancer (HCC), Hypertension, Prostate irregularity, and Trigger finger, left ring finger.    Surgical History:  has a past surgical history that includes hernia repair; Skin cancer excision; Leg Surgery (Left, 2011); Ankle Fusion (Left); Finger

## 2024-08-19 NOTE — PROGRESS NOTES
Subjective   Meng Arriaza is a 88 y.o. male who presents for the following: Skin Check.  Skin Cancer Screening  He has a history of moderate sun exposure. He is in the sun occasionally. He uses sunscreen occasionally. He reports no skin symptoms. His moles are not changing.    PMHX NMSC.        Objective   Well appearing patient in no apparent distress; mood and affect are within normal limits.    A focused examination was performed including upper extremities, including the arms, hands, fingers, and fingernails and head, including the scalp, face, neck, nose, ears, eyelids, and lips. All findings within normal limits unless otherwise noted below.    Left Nasal Sidewall  Small yellow, lobulated papules with a central dell.    Head, Neck  Stuck on verrucous, tan-brown papules and plaques.      Head, Left Arm, Neck, Right Arm  Scattered tan macules in sun-exposed areas.      Assessment/Plan   Sebaceous hyperplasia of face  Left Nasal Sidewall    Sebaceous hyperplasia is a common, harmless condition where sebaceous (oil) glands become enlarged and filled with an oily substance called sebum. These enlarged glands most often appear on the central face, such as the forehead. There may be just one lesion, but more often there are multiple lesions. Typically, sebaceous hyperplasia lesions are small and do not present any health concern, but in rare instances they can be numerous and become disfiguring.  - Reassured patient, the lesions are benign.     Seborrheic keratosis (2)  Head; Neck    Although Seborrheic Keratoses can be troublesome and unsightly, they are entirely benign.  Removal of Seborrheic Keratoses is considered a cosmetic procedure. Removal is typically performed using liquid nitrogen cryotherapy.  Treatment of current lesions does not prevent the development of new Seborrheic Keratoses in the future.    Lentigo (4)  Head; Left Arm; Right Arm; Neck    A solar lentigo (plural, solar lentigines), sometimes called  an age spot or liver spot, is a brown macule (small, flat, smooth area of skin) caused by chronic sun or artificial ultraviolet (UV) light exposure. There may be just one lentigo or there may be multiple. This type of lentigo is different from lentigo simplex (discussed separately) because it is caused by exposure to UV light. Solar lentigines are benign, but they do indicate excessive sun exposure, a risk factor for the development of skin cancer.  Lesions are benign, no treatment needed.     Follow up in 6 months for a total body skin exam. Please call me if there are any changes or development of concerning symptoms (lesion/skin condition is changing, bleeding, enlarging, or worsening).

## 2024-08-21 ENCOUNTER — APPOINTMENT (OUTPATIENT)
Dept: CARDIAC REHAB | Age: 89
End: 2024-08-21
Payer: MEDICARE

## 2024-08-23 ENCOUNTER — APPOINTMENT (OUTPATIENT)
Dept: CARDIAC REHAB | Age: 89
End: 2024-08-23
Payer: MEDICARE

## 2024-08-26 ENCOUNTER — APPOINTMENT (OUTPATIENT)
Dept: CARDIAC REHAB | Age: 89
End: 2024-08-26
Payer: MEDICARE

## 2024-08-28 ENCOUNTER — APPOINTMENT (OUTPATIENT)
Dept: CARDIAC REHAB | Age: 89
End: 2024-08-28
Payer: MEDICARE

## 2024-08-30 ENCOUNTER — APPOINTMENT (OUTPATIENT)
Dept: CARDIAC REHAB | Age: 89
End: 2024-08-30
Payer: MEDICARE

## 2024-09-04 ENCOUNTER — APPOINTMENT (OUTPATIENT)
Dept: CARDIAC REHAB | Age: 89
End: 2024-09-04
Payer: MEDICARE

## 2024-09-06 ENCOUNTER — APPOINTMENT (OUTPATIENT)
Dept: CARDIAC REHAB | Age: 89
End: 2024-09-06
Payer: MEDICARE

## 2024-09-09 ENCOUNTER — APPOINTMENT (OUTPATIENT)
Dept: CARDIAC REHAB | Age: 89
End: 2024-09-09
Payer: MEDICARE

## 2024-09-10 ENCOUNTER — TELEPHONE (OUTPATIENT)
Dept: CARDIAC REHAB | Age: 89
End: 2024-09-10

## 2024-09-11 ENCOUNTER — APPOINTMENT (OUTPATIENT)
Dept: CARDIAC REHAB | Age: 89
End: 2024-09-11
Payer: MEDICARE

## 2024-09-13 ENCOUNTER — APPOINTMENT (OUTPATIENT)
Dept: CARDIAC REHAB | Age: 89
End: 2024-09-13
Payer: MEDICARE

## 2024-09-16 ENCOUNTER — APPOINTMENT (OUTPATIENT)
Dept: CARDIAC REHAB | Age: 89
End: 2024-09-16
Payer: MEDICARE

## 2024-09-16 ENCOUNTER — HOSPITAL ENCOUNTER (OUTPATIENT)
Dept: CARDIAC REHAB | Age: 89
Setting detail: THERAPIES SERIES
Discharge: HOME | End: 2024-09-16
Payer: MEDICARE

## 2024-09-16 PROCEDURE — 93798 PHYS/QHP OP CAR RHAB W/ECG: CPT

## 2024-09-16 ASSESSMENT — PATIENT HEALTH QUESTIONNAIRE - PHQ9
SUM OF ALL RESPONSES TO PHQ QUESTIONS 1-9: 1
5. POOR APPETITE OR OVEREATING: NOT AT ALL
1. LITTLE INTEREST OR PLEASURE IN DOING THINGS: NOT AT ALL
2. FEELING DOWN, DEPRESSED OR HOPELESS: NOT AT ALL
SUM OF ALL RESPONSES TO PHQ QUESTIONS 1-9: 1
10. IF YOU CHECKED OFF ANY PROBLEMS, HOW DIFFICULT HAVE THESE PROBLEMS MADE IT FOR YOU TO DO YOUR WORK, TAKE CARE OF THINGS AT HOME, OR GET ALONG WITH OTHER PEOPLE: SOMEWHAT DIFFICULT
9. THOUGHTS THAT YOU WOULD BE BETTER OFF DEAD, OR OF HURTING YOURSELF: NOT AT ALL
SUM OF ALL RESPONSES TO PHQ QUESTIONS 1-9: 1
8. MOVING OR SPEAKING SO SLOWLY THAT OTHER PEOPLE COULD HAVE NOTICED. OR THE OPPOSITE, BEING SO FIGETY OR RESTLESS THAT YOU HAVE BEEN MOVING AROUND A LOT MORE THAN USUAL: NOT AT ALL
SUM OF ALL RESPONSES TO PHQ9 QUESTIONS 1 & 2: 0
6. FEELING BAD ABOUT YOURSELF - OR THAT YOU ARE A FAILURE OR HAVE LET YOURSELF OR YOUR FAMILY DOWN: NOT AT ALL
4. FEELING TIRED OR HAVING LITTLE ENERGY: SEVERAL DAYS
SUM OF ALL RESPONSES TO PHQ QUESTIONS 1-9: 1
3. TROUBLE FALLING OR STAYING ASLEEP: NOT AT ALL
7. TROUBLE CONCENTRATING ON THINGS, SUCH AS READING THE NEWSPAPER OR WATCHING TELEVISION: NOT AT ALL

## 2024-09-16 ASSESSMENT — EXERCISE STRESS TEST: PEAK_BP: 134/74

## 2024-09-16 ASSESSMENT — EJECTION FRACTION: EF_VALUE: 45

## 2024-09-18 ENCOUNTER — APPOINTMENT (OUTPATIENT)
Dept: CARDIAC REHAB | Age: 89
End: 2024-09-18
Payer: MEDICARE

## 2024-09-20 ENCOUNTER — APPOINTMENT (OUTPATIENT)
Dept: CARDIAC REHAB | Age: 89
End: 2024-09-20
Payer: MEDICARE

## 2024-09-23 ENCOUNTER — APPOINTMENT (OUTPATIENT)
Dept: CARDIAC REHAB | Age: 89
End: 2024-09-23
Payer: MEDICARE

## 2024-09-25 ENCOUNTER — HOSPITAL ENCOUNTER (OUTPATIENT)
Dept: CARDIAC REHAB | Age: 89
Setting detail: THERAPIES SERIES
Discharge: HOME | End: 2024-09-25
Payer: MEDICARE

## 2024-09-25 ENCOUNTER — APPOINTMENT (OUTPATIENT)
Dept: CARDIAC REHAB | Age: 89
End: 2024-09-25
Payer: MEDICARE

## 2024-09-25 PROCEDURE — 93798 PHYS/QHP OP CAR RHAB W/ECG: CPT

## 2024-09-27 ENCOUNTER — APPOINTMENT (OUTPATIENT)
Dept: CARDIAC REHAB | Age: 89
End: 2024-09-27
Payer: MEDICARE

## 2024-09-27 ENCOUNTER — HOSPITAL ENCOUNTER (OUTPATIENT)
Dept: CARDIAC REHAB | Age: 89
Setting detail: THERAPIES SERIES
Discharge: HOME | End: 2024-09-27
Payer: MEDICARE

## 2024-09-27 PROCEDURE — 93798 PHYS/QHP OP CAR RHAB W/ECG: CPT

## 2024-09-30 ENCOUNTER — HOSPITAL ENCOUNTER (OUTPATIENT)
Dept: CARDIAC REHAB | Age: 89
Setting detail: THERAPIES SERIES
Discharge: HOME | End: 2024-09-30
Payer: MEDICARE

## 2024-09-30 ENCOUNTER — APPOINTMENT (OUTPATIENT)
Dept: CARDIAC REHAB | Age: 89
End: 2024-09-30
Payer: MEDICARE

## 2024-09-30 PROCEDURE — 93798 PHYS/QHP OP CAR RHAB W/ECG: CPT

## 2024-10-02 ENCOUNTER — APPOINTMENT (OUTPATIENT)
Dept: CARDIAC REHAB | Age: 89
End: 2024-10-02
Payer: MEDICARE

## 2024-10-04 ENCOUNTER — APPOINTMENT (OUTPATIENT)
Dept: CARDIAC REHAB | Age: 89
End: 2024-10-04
Payer: MEDICARE

## 2024-10-07 ENCOUNTER — APPOINTMENT (OUTPATIENT)
Dept: CARDIAC REHAB | Age: 89
End: 2024-10-07
Payer: MEDICARE

## 2024-10-14 ENCOUNTER — HOSPITAL ENCOUNTER (OUTPATIENT)
Dept: CARDIAC REHAB | Age: 89
Setting detail: THERAPIES SERIES
Discharge: HOME | End: 2024-10-14

## 2024-10-14 NOTE — PROGRESS NOTES
10/14/24 1129   Treatment Diagnosis   Treatment Diagnosis 1 PCI   PCI Date 06/25/24   Referral Date 06/25/24   Co-morbidities Malignancy   Individual Treatment Plan   ITP Visit Type Discharge, did not complete program   Visit #/Total Visits 13/36   Psychosocial Assessment   Stages of Change   (unable to obtain current PHQ9 score)     PT has not been attending.  Discharging at this time

## 2025-02-19 ENCOUNTER — APPOINTMENT (OUTPATIENT)
Dept: DERMATOLOGY | Facility: CLINIC | Age: 89
End: 2025-02-19
Payer: MEDICARE

## 2025-02-19 DIAGNOSIS — D48.5 NEOPLASM OF UNCERTAIN BEHAVIOR OF SKIN: Primary | ICD-10-CM

## 2025-02-19 DIAGNOSIS — Z12.83 SKIN CANCER SCREENING: ICD-10-CM

## 2025-02-19 DIAGNOSIS — L57.0 ACTINIC KERATOSIS: ICD-10-CM

## 2025-02-19 PROCEDURE — 11103 TANGNTL BX SKIN EA SEP/ADDL: CPT | Performed by: NURSE PRACTITIONER

## 2025-02-19 PROCEDURE — 11102 TANGNTL BX SKIN SINGLE LES: CPT | Performed by: NURSE PRACTITIONER

## 2025-02-19 PROCEDURE — 17000 DESTRUCT PREMALG LESION: CPT | Performed by: NURSE PRACTITIONER

## 2025-02-19 PROCEDURE — 99213 OFFICE O/P EST LOW 20 MIN: CPT | Performed by: NURSE PRACTITIONER

## 2025-02-19 PROCEDURE — 69100 BIOPSY OF EXTERNAL EAR: CPT | Performed by: NURSE PRACTITIONER

## 2025-02-19 PROCEDURE — 17003 DESTRUCT PREMALG LES 2-14: CPT | Performed by: NURSE PRACTITIONER

## 2025-02-19 PROCEDURE — 1159F MED LIST DOCD IN RCRD: CPT | Performed by: NURSE PRACTITIONER

## 2025-02-19 PROCEDURE — 1160F RVW MEDS BY RX/DR IN RCRD: CPT | Performed by: NURSE PRACTITIONER

## 2025-02-19 RX ORDER — FLUTICASONE PROPIONATE 50 MCG
SPRAY, SUSPENSION (ML) NASAL
COMMUNITY

## 2025-02-19 RX ORDER — CLOPIDOGREL BISULFATE 75 MG/1
75 TABLET ORAL DAILY
COMMUNITY

## 2025-02-19 RX ORDER — ASPIRIN 81 MG/1
81 TABLET ORAL DAILY
COMMUNITY

## 2025-02-19 NOTE — PROGRESS NOTES
Subjective     Yonny Arriaza is a 89 y.o. male who presents for the following: Skin Check (Pt presents to office for full skin exam.  Last full skin exam 08/19/2024.  Pt has history of BCC and SCC.  Pt has scattered  lesions of concern at this time. Chaperone offered and declined.//).     Review of Systems:  No other skin or systemic complaints other than what is documented elsewhere in the note.    The following portions of the chart were reviewed this encounter and updated as appropriate:  Tobacco  Allergies  Meds  Problems  Med Hx  Surg Hx  Fam Hx       Skin Cancer History  Biopsy Date Type Location Status   11/3/23 BCC Right Tip of Nose Treatment Complete  2/12/24   11/3/23 SCC in Situ Mid Parietal Scalp Treatment Complete  2/12/24     Specialty Problems    None    Past Medical History:  Yonny Arriaza  has a past medical history of Cruz's esophagus and Enlarged prostate.    Past Surgical History:  Yonny Arriaza  has a past surgical history that includes Lung removal, partial.    Family History:  Patient family history is not on file.    Social History:  Yonny Arriaza  has no history on file for tobacco use, alcohol use, and drug use.    Allergies:  Patient has no known allergies.    Current Medications / CAM's:    Current Outpatient Medications:     aspirin 81 mg EC tablet, Take 1 tablet (81 mg) by mouth once daily., Disp: , Rfl:     carvedilol (Coreg) 3.125 mg tablet, Take 1 tablet (3.125 mg) by mouth 2 times a day., Disp: , Rfl:     cholecalciferol (Vitamin D-3) 125 MCG (5000 UT) capsule, Take 1 capsule (125 mcg) by mouth once daily., Disp: , Rfl:     clopidogrel (Plavix) 75 mg tablet, Take 1 tablet (75 mg) by mouth once daily., Disp: , Rfl:     diazePAM (Valium) 2 mg tablet, Take 1 tablet as needed by oral route., Disp: , Rfl:     doxazosin mesylate (CARDURA ORAL), , Disp: , Rfl:     esomeprazole magnesium (NEXIUM ORAL), , Disp: , Rfl:     fluticasone (Flonase) 50 mcg/actuation nasal spray, SHAKE LIQUID AND  USE 2 SPRAYS INTRANASALLY EVERY DAY AT BEDTIME, Disp: , Rfl:     losartan (Cozaar) 50 mg tablet, Take 1 tablet (50 mg) by mouth early in the morning.., Disp: , Rfl:     rosuvastatin (Crestor) 20 mg tablet, Take 1 tablet (20 mg) by mouth once daily at bedtime., Disp: , Rfl:      Objective   Well appearing patient in no apparent distress; mood and affect are within normal limits.    A focused skin examination was performed. All findings within normal limits unless otherwise noted below.    Assessment/Plan   1. Neoplasm of uncertain behavior of skin (4)  Mid Back  4mm hyperkeratotic papule              Lesion biopsy  Type of biopsy: tangential    Informed consent: discussed and consent obtained    Timeout: patient name, date of birth, surgical site, and procedure verified    Procedure prep:  Patient was prepped and draped  Anesthesia: the lesion was anesthetized in a standard fashion    Anesthetic:  1% lidocaine w/ epinephrine 1-100,000 local infiltration  Instrument used: DermaBlade    Hemostasis achieved with: aluminum chloride    Outcome: patient tolerated procedure well    Post-procedure details: sterile dressing applied and wound care instructions given    Dressing type: petrolatum and bandage      Specimen 1 - Dermatopathology- DERM LAB  Differential Diagnosis: R/O  NMSC  Check Margins Yes/No?:    Comments:    Dermpath Lab: Routine Histopathology (formalin-fixed tissue)    Left Tip of Nose  3 mm ulcer with surrounding erythema              Lesion biopsy  Type of biopsy: tangential    Informed consent: discussed and consent obtained    Timeout: patient name, date of birth, surgical site, and procedure verified    Procedure prep:  Patient was prepped and draped  Anesthesia: the lesion was anesthetized in a standard fashion    Anesthetic:  1% lidocaine w/ epinephrine 1-100,000 local infiltration  Instrument used: DermaBlade    Hemostasis achieved with: aluminum chloride    Outcome: patient tolerated procedure well     Post-procedure details: sterile dressing applied and wound care instructions given    Dressing type: petrolatum and bandage      Specimen 2 - Dermatopathology- DERM LAB  Differential Diagnosis: R/O NMSC  Check Margins Yes/No?:    Comments:    Dermpath Lab: Routine Histopathology (formalin-fixed tissue)    Right Ear - Tragus  6 mm erythematous, scaly papule              Lesion biopsy  Type of biopsy: tangential    Informed consent: discussed and consent obtained    Timeout: patient name, date of birth, surgical site, and procedure verified    Procedure prep:  Patient was prepped and draped  Anesthesia: the lesion was anesthetized in a standard fashion    Anesthetic:  1% lidocaine w/ epinephrine 1-100,000 local infiltration  Instrument used: DermaBlade    Hemostasis achieved with: aluminum chloride    Outcome: patient tolerated procedure well    Post-procedure details: sterile dressing applied and wound care instructions given    Dressing type: petrolatum and bandage      Specimen 3 - Dermatopathology- DERM LAB  Differential Diagnosis: R/O NMSC  Check Margins Yes/No?:    Comments:    Dermpath Lab: Routine Histopathology (formalin-fixed tissue)    Left Zygomatic Area  7 mm erythematous, scaly papule              Lesion biopsy  Type of biopsy: tangential    Informed consent: discussed and consent obtained    Timeout: patient name, date of birth, surgical site, and procedure verified    Procedure prep:  Patient was prepped and draped  Anesthesia: the lesion was anesthetized in a standard fashion    Anesthetic:  1% lidocaine w/ epinephrine 1-100,000 local infiltration  Instrument used: DermaBlade    Hemostasis achieved with: aluminum chloride    Outcome: patient tolerated procedure well    Post-procedure details: sterile dressing applied and wound care instructions given    Dressing type: petrolatum and bandage      Specimen 4 - Dermatopathology- DERM LAB  Differential Diagnosis: R/O NMSC  Check Margins Yes/No?:     Comments:    Dermpath Lab: Routine Histopathology (formalin-fixed tissue)    -  Discussed differential with patient.   - Given uncertainty of clinical diagnosis, a biopsy is recommended in clinic today.   - The patient expressed understanding, is in agreement with this plan, and wishes to proceed with biopsy.   - Oral and written wound care instructions provided.   - Advised the patient that the office will call within 2 weeks to discuss biopsy results.     2. Skin cancer screening    Related Procedures  Follow Up In Dermatology - Established Patient    3. Actinic keratosis (6)  Left Ear, Left Forehead, Mid Forehead (2), Right Parotid Area, Right Temple  Erythematous scaly macules    -Discussed nature of diagnosis and treatment options.   -Patient wishes to proceed with Cryotherapy today  -Possible side effects of liquid nitrogen treatment reviewed including formation of blisters, crusting, tenderness, scar, and discoloration which may be permanent.  -Patient advised to return the office for re-evaluation if the treated lesion(s) do not resolve within 4-6 weeks. Patient verbalizes understanding.    Destr of lesion - Left Ear, Left Forehead, Mid Forehead (2), Right Parotid Area, Right Gordon  Complexity: simple    Destruction method: cryotherapy    Informed consent: discussed and consent obtained    Lesion destroyed using liquid nitrogen: Yes    Region frozen until ice ball extended beyond lesion: Yes    Cryotherapy cycles:  1  Outcome: patient tolerated procedure well with no complications    Post-procedure details: wound care instructions given        Follow up in 6 months for a total body skin exam. Please call me if there are any changes or development of concerning symptoms (lesion/skin condition is changing, bleeding, enlarging, or worsening).

## 2025-02-25 DIAGNOSIS — C44.92 SCC (SQUAMOUS CELL CARCINOMA): ICD-10-CM

## 2025-02-25 DIAGNOSIS — C44.311 BASAL CELL CARCINOMA (BCC) OF LEFT NASAL TIP: ICD-10-CM

## 2025-02-25 DIAGNOSIS — C44.529 SQUAMOUS CELL CARCINOMA OF BACK: ICD-10-CM

## 2025-03-15 ENCOUNTER — APPOINTMENT (OUTPATIENT)
Dept: GENERAL RADIOLOGY | Age: 89
End: 2025-03-15
Payer: MEDICARE

## 2025-03-15 ENCOUNTER — APPOINTMENT (OUTPATIENT)
Dept: CT IMAGING | Age: 89
End: 2025-03-15
Payer: MEDICARE

## 2025-03-15 ENCOUNTER — HOSPITAL ENCOUNTER (EMERGENCY)
Age: 89
Discharge: HOME | End: 2025-03-15
Attending: STUDENT IN AN ORGANIZED HEALTH CARE EDUCATION/TRAINING PROGRAM
Payer: MEDICARE

## 2025-03-15 VITALS
SYSTOLIC BLOOD PRESSURE: 166 MMHG | BODY MASS INDEX: 26.61 KG/M2 | HEART RATE: 76 BPM | WEIGHT: 175 LBS | OXYGEN SATURATION: 97 % | TEMPERATURE: 98.6 F | DIASTOLIC BLOOD PRESSURE: 76 MMHG | RESPIRATION RATE: 18 BRPM

## 2025-03-15 DIAGNOSIS — S61.215S: ICD-10-CM

## 2025-03-15 DIAGNOSIS — S00.03XA HEMATOMA OF FRONTAL SCALP, INITIAL ENCOUNTER: Primary | ICD-10-CM

## 2025-03-15 DIAGNOSIS — S60.459A METAL FOREIGN BODY IN FINGER: ICD-10-CM

## 2025-03-15 PROCEDURE — 73030 X-RAY EXAM OF SHOULDER: CPT

## 2025-03-15 PROCEDURE — 73110 X-RAY EXAM OF WRIST: CPT

## 2025-03-15 PROCEDURE — 90715 TDAP VACCINE 7 YRS/> IM: CPT

## 2025-03-15 PROCEDURE — 13131 CMPLX RPR F/C/C/M/N/AX/G/H/F: CPT

## 2025-03-15 PROCEDURE — 99284 EMERGENCY DEPT VISIT MOD MDM: CPT

## 2025-03-15 PROCEDURE — 71045 X-RAY EXAM CHEST 1 VIEW: CPT

## 2025-03-15 PROCEDURE — 70450 CT HEAD/BRAIN W/O DYE: CPT

## 2025-03-15 PROCEDURE — 6370000000 HC RX 637 (ALT 250 FOR IP)

## 2025-03-15 PROCEDURE — 73130 X-RAY EXAM OF HAND: CPT

## 2025-03-15 PROCEDURE — 12001 RPR S/N/AX/GEN/TRNK 2.5CM/<: CPT

## 2025-03-15 PROCEDURE — 72125 CT NECK SPINE W/O DYE: CPT

## 2025-03-15 PROCEDURE — 6360000002 HC RX W HCPCS

## 2025-03-15 PROCEDURE — 90471 IMMUNIZATION ADMIN: CPT

## 2025-03-15 RX ORDER — ACETAMINOPHEN 500 MG
1000 TABLET ORAL ONCE
Status: COMPLETED | OUTPATIENT
Start: 2025-03-15 | End: 2025-03-15

## 2025-03-15 RX ADMIN — ACETAMINOPHEN 1000 MG: 500 TABLET ORAL at 20:32

## 2025-03-15 RX ADMIN — TETANUS TOXOID, REDUCED DIPHTHERIA TOXOID AND ACELLULAR PERTUSSIS VACCINE, ADSORBED 0.5 ML: 5; 2.5; 8; 8; 2.5 SUSPENSION INTRAMUSCULAR at 20:35

## 2025-03-15 ASSESSMENT — LIFESTYLE VARIABLES
HOW OFTEN DO YOU HAVE A DRINK CONTAINING ALCOHOL: NEVER
HOW MANY STANDARD DRINKS CONTAINING ALCOHOL DO YOU HAVE ON A TYPICAL DAY: PATIENT DOES NOT DRINK

## 2025-03-15 ASSESSMENT — PAIN SCALES - GENERAL: PAINLEVEL_OUTOF10: 8

## 2025-03-15 ASSESSMENT — PAIN DESCRIPTION - ORIENTATION: ORIENTATION: LEFT

## 2025-03-15 ASSESSMENT — PAIN DESCRIPTION - LOCATION: LOCATION: HEAD;ARM;SHOULDER

## 2025-03-15 ASSESSMENT — PAIN DESCRIPTION - DESCRIPTORS: DESCRIPTORS: SHARP

## 2025-03-16 NOTE — ED PROVIDER NOTES
89-year-old male presents the emerged part after mechanical fall.  He stated that his shoes laces got tied up together in which he fell landing on his left forehead left hand.  He does report a headache on the left forehead region that does not radiate described as a throbbing-like sensation.  Otherwise he noticed a laceration to his left ring finger but does not report any pain.  He denies syncope, vision changes, chest pain, abdominal pain, nausea, vomiting, lower extremity pain, difficulty ambulating from his baseline.  He is not on any anticoagulation medication but does take aspirin and clopidogrel.        Chief Complaint   Patient presents with    Head Injury     Trip and fall about 2 hours ago, no loc, no n/v, abrasion/hematoma left forehead, left arm pain, left ring finger lac, no pain in back or legs, no neck        Review of Systems   Pert stated in the hpi above   Physical Exam  Vitals reviewed.   Constitutional:       General: He is not in acute distress.     Appearance: He is not ill-appearing.   HENT:      Head: Normocephalic.      Comments: Left frontal hematoma     Right Ear: External ear normal.      Left Ear: External ear normal.      Nose: Nose normal.      Mouth/Throat:      Mouth: Mucous membranes are moist.   Eyes:      General:         Right eye: No discharge.         Left eye: No discharge.      Conjunctiva/sclera: Conjunctivae normal.   Cardiovascular:      Rate and Rhythm: Normal rate and regular rhythm.      Heart sounds:      No friction rub. No gallop.   Pulmonary:      Effort: No respiratory distress.      Breath sounds: No stridor.   Abdominal:      General: There is no distension.      Tenderness: There is no abdominal tenderness. There is no guarding or rebound.   Musculoskeletal:         General: No deformity or signs of injury.      Cervical back: Normal range of motion and neck supple. No rigidity or tenderness.   Skin:     Capillary Refill: Capillary refill takes less than 2

## 2025-03-16 NOTE — DISCHARGE INSTRUCTIONS
Ice the bruised area 10 minutes on and 10 minutes off do this 3 times a day  Take Tylenol 650 mg every 4-6 hours for the next 3 days and take ibuprofen 400-600 mg 10 to 15 minutes after eating meal for the next 3 days as well

## 2025-03-26 ENCOUNTER — APPOINTMENT (OUTPATIENT)
Dept: DERMATOLOGY | Facility: CLINIC | Age: 89
End: 2025-03-26
Payer: MEDICARE

## 2025-03-27 ENCOUNTER — APPOINTMENT (OUTPATIENT)
Dept: DERMATOLOGY | Facility: CLINIC | Age: 89
End: 2025-03-27
Payer: MEDICARE

## 2025-04-02 ENCOUNTER — APPOINTMENT (OUTPATIENT)
Dept: DERMATOLOGY | Facility: CLINIC | Age: 89
End: 2025-04-02
Payer: MEDICARE

## 2025-04-02 VITALS — HEART RATE: 65 BPM | SYSTOLIC BLOOD PRESSURE: 144 MMHG | DIASTOLIC BLOOD PRESSURE: 76 MMHG

## 2025-04-02 DIAGNOSIS — C44.311 BASAL CELL CARCINOMA (BCC) OF LEFT NASAL TIP: ICD-10-CM

## 2025-04-02 PROCEDURE — 17311 MOHS 1 STAGE H/N/HF/G: CPT | Performed by: DERMATOLOGY

## 2025-04-02 PROCEDURE — 99214 OFFICE O/P EST MOD 30 MIN: CPT | Performed by: DERMATOLOGY

## 2025-04-02 PROCEDURE — 13151 CMPLX RPR E/N/E/L 1.1-2.5 CM: CPT | Performed by: DERMATOLOGY

## 2025-04-02 PROCEDURE — 17312 MOHS ADDL STAGE: CPT | Performed by: DERMATOLOGY

## 2025-04-02 NOTE — PROGRESS NOTES
Mohs Surgery Operative Note    Date of Surgery:  4/2/2025  Surgeon:  Evangelina Eid MD  Office Location:  [address] [facility]  [address]  [address]  [facility] [address]  Dept: [phone]  Dept Fax: [phone]  Referring Provider: Susan L Mayne, APRN-CNP  [address]  [place] [address]      Assessment/Plan   Pre-procedure:   Obtained informed consent: written from patient  The surgical site was identified and confirmed with the patient.     Intra-operative:   Audible time out called at : 08:46 AM 04/02/25  by: Analy Lares MA   Verified patient name, birthdate, site, specimen bottle label & requisition.    The planned procedure(s) was again reviewed with the patient. The risks of bleeding, infection, nerve damage and scarring were reviewed. Written authorization was obtained. The patient identity, surgical site, and planned procedure(s) were verified. The provider acted as both surgeon and pathologist.     Basal cell carcinoma (BCC) of left nasal tip  Left Tip of Nose  Mohs surgery  Consent obtained: written    Universal Protocol:  Procedure explained and questions answered to patient or proxy's satisfaction: Yes    Test results available and properly labeled: Yes    Pathology report reviewed: Yes    External notes reviewed: Yes    Photo or diagram used for site identification: Yes    Site/side marked: Yes    Slide independently reviewed by Mohs surgeon: Yes    Immediately prior to procedure a time out was called: Yes    Patient identity confirmed: verbally with patient  Preparation: Patient was prepped and draped in usual sterile fashion      Anticoagulation:  Is the patient taking prescription anticoagulant and/or aspirin prescribed/recommended by a physician? Yes    Was the anticoagulation regimen changed prior to Mohs? No      Anesthesia:  Anesthesia method: local infiltration  Local anesthetic: 1.5% lido w/epi.    Procedure Details:  Case ID Number:  [id number]  Biopsy accession number: [id number]  Date of biopsy: 2/19/2025  Pre-Op diagnosis: basal cell carcinoma  BCC subtype: nodular  Surgical site (from skin exam): Left Tip of Nose  Pre-operative length (cm): 0.7  Pre-operative width (cm): 0.7  Indications for Mohs surgery: anatomic location where tissue conservation is critical  Previously treated? No      Micrographic Surgery Details:  Post-operative length (cm): 0.9  Post-operative width (cm): 0.9  Number of Mohs stages: 2    Stage 1     Comments: The patient was brought into the operating room and placed in the procedure chair in the appropriate position.  The area positive by previous biopsy was identified and confirmed with the patient. The area of clinically obvious tumor was debulked using a curette and/or scalpel as needed. An incision was made following the Mohs approach through the skin. The specimen was taken to the lab, divided into 2 piece(s) and appropriately chromacoded and processed.  Tumor was seen on the deep margins as indicated on the on the Mohs map.  Nodular basal cell carcinoma. Histologic examination revealed large tumor aggregates of atypical basaloid cells with peripheral palisading and tumoral clefting.   Tumor features identified on Mohs section: basal carcinoma      Depth of invasion: dermis    Stage 2     Comments: The area of positivity as noted on the Mohs map in the previous stage was identified and removed using the Mohs technique. The specimen was taken to the lab and appropriately chromacoded and processed in 1 piece(s).     Tumor features identified on Mohs section: no tumor identified  Depth of defect: subcutaneous fat    Patient tolerance of procedure: tolerated well, no immediate complications    Reconstruction:  Was the defect reconstructed? Yes    Was reconstruction performed by the same Mohs surgeon? Yes    Setting of reconstruction: outpatient office  When was reconstruction performed? same day  Type of reconstruction:  linear  Linear reconstruction: complex  Length of linear repair (cm): 1.2  Subcutaneous Layers (Deep Stitches)   Suture size:  5-0  Suture type:  Vicryl  Stitches:  Buried vertical mattress  Fine/surface layer approximation (top stitches)   Epidermal/Superficial suture size:  5-0  Epidermal/Superficial suture type:  Fast-absorbing gut  Stitches: simple running    Hemostasis achieved with: electrodesiccation  Outcome: patient tolerated procedure well with no complications    Post-procedure details: sterile dressing applied and wound care instructions given    Dressing type: pressure dressing      Complex Linear Repair - Wide Undermining:  Given the location and size of the defect, it was determined that a complex layered linear closure was required to restore normal anatomy and function. The repair was considered complex because extensive undermining was required and performed. The amount of undermining performed was greater than the maximum width of the defect as measured perpendicular to the closure line along at least one entire edge of the defect. Standing cutaneous cones were removed using Burow's triangles. The wound edges were brought into close approximation with buried vertical mattress sutures. The remainder of the wound was then closed with epidermal top sutures.    The final repair measured 1.2 cm                Wound care was discussed, and the patient was given written post-operative wound care instructions.      The patient will follow up with Evangelina Eid MD as needed for any post operative problems or concerns, and will follow up with their primary dermatologist as scheduled.

## 2025-04-02 NOTE — PROGRESS NOTES
"Office Visit Note  Date: 4/2/2025  Surgeon:  Evangelina Eid MD  Office Location:  [address] [facility]  [address]  [address]  [facility] [address]  Dept: [phone]  Dept Fax: [phone]  Referring Provider: Susan L Mayne, APRN-CNP  [address]  [place] [address]    Shasha Arriaza \"Yonny\" is a 89 y.o. male who presents for the following: MOHS Surgery (Left Tip of Nose-BCC)    According to the patient, the lesion has been present for approximately 1 month at the time of diagnosis.  The lesion is painful.  The lesion has not been treated previously.    The patient does not have a pacemaker / defibrillator.  The patient does not have a heart valve / joint replacement.    The patient is on blood thinners.  The patient does not have a history of hepatitis B or C.  The patient does not have a history of HIV.  The patient does not have a history of immunosuppression (e.g. organ transplantation, malignancy, medications)    Review of Systems:  No other skin or systemic complaints other than what is documented elsewhere in the note.    MEDICAL HISTORY: clinically relevant history including significant past medical history, medications and allergies was reviewed and documented in Epic.    Objective   Well appearing patient in no apparent distress; mood and affect are within normal limits.  Vital signs: See record.  Noted on the Left Tip of Nose  Is a 0.7 x 0.7 cm scar    The patient confirmed the identified site.    Discussion:  The nature of the diagnosis was explained. The lesion is a skin cancer.  It has a risk of local growth and distant spread. The condition is associated with sun exposure.  Warning signs of non-melanoma skin cancer discussed. Patient was instructed to perform monthly self skin examination.  We recommended that the patient have regular full skin exams given an increased risk of subsequent skin cancers. The patient was instructed to use sun " protective behaviors including use of broad spectrum sunscreens and sun protective clothing to reduce risk of skin cancers.      Risks, benefits, side effects of Mohs surgery were discussed with patient and the patient voiced understanding.  It was explained that even though the cure rate of Mohs is very high it is not 100%. Risks of surgery including but not limited to bleeding, infection, numbness, nerve damage, and scar were reviewed.  Discussion included wound care requirements, activity restrictions, likely scar outcome and time to heal.     After Mohs surgery, the defect may need to be repaired surgically and the scar may be longer than the original lesion.  Reconstruction options, risks, and benefits were reviewed including second intention healing, linear repair (4-1 ratio was explained), local flaps, skin grafts, cartilage grafts and interpolation flaps (the need for multiple surgeries was explained). Possible outcomes were reviewed including likely scar appearance, failure of flap survival, infection, bleeding and the need for revision surgery.     The pathology was reviewed, the photograph was reviewed, and the referring physician's note was reviewed.    Patient elected for Mohs surgery.

## 2025-04-21 ENCOUNTER — APPOINTMENT (OUTPATIENT)
Dept: DERMATOLOGY | Facility: CLINIC | Age: 89
End: 2025-04-21
Payer: MEDICARE

## 2025-04-28 ENCOUNTER — APPOINTMENT (OUTPATIENT)
Dept: DERMATOLOGY | Facility: CLINIC | Age: 89
End: 2025-04-28
Payer: MEDICARE

## 2025-04-30 ENCOUNTER — TELEPHONE (OUTPATIENT)
Dept: DERMATOLOGY | Facility: CLINIC | Age: 89
End: 2025-04-30
Payer: MEDICARE

## 2025-04-30 NOTE — TELEPHONE ENCOUNTER
LVM for pt regarding cancelled excision to biopsy site on mid back. Requested call back to discuss rescheduling.     Azeb Billy RN

## 2025-05-06 ENCOUNTER — TELEPHONE (OUTPATIENT)
Dept: DERMATOLOGY | Facility: CLINIC | Age: 89
End: 2025-05-06
Payer: MEDICARE

## 2025-05-06 NOTE — TELEPHONE ENCOUNTER
Pt contacted office at this time regarding cancelled MSO procedure. Pt states that he no longer feels the spot on his back so he cancelled the procedure. Explained to pt that often times the surface of the skin will heal after the biopsy is done, but the skin cancer remains deeper. Pt verbalized understanding and is agreeable to rescheduling MSO. Mohs schedulers notified to contact pt at this time.     Pt also describing a stitch sticking out of his previous mohs site on his nose. Pt states the area is healed. Advised pt to apply a warm compress and can try to pull the stitch out if comfortable. Advised pt that if he is having to pull with a lot of force and no success to contact office for an appointment on one of the surgeons schedules to help remove the suture. Pt verbalized understanding and had no further questions at this time.     Azeb Billy RN

## 2025-06-09 ENCOUNTER — APPOINTMENT (OUTPATIENT)
Dept: DERMATOLOGY | Facility: CLINIC | Age: 89
End: 2025-06-09
Payer: MEDICARE

## 2025-06-09 DIAGNOSIS — L57.0 ACTINIC KERATOSIS: Primary | ICD-10-CM

## 2025-06-09 PROCEDURE — 17000 DESTRUCT PREMALG LESION: CPT | Performed by: DERMATOLOGY

## 2025-06-09 NOTE — PROGRESS NOTES
"Shasha Arriaza \"Yonny\" is a 89 y.o. male who presents for the following: evaluation mid-back. He notes the area is no longer painful since the biopsy in February.       Review of Systems:  No other skin or systemic complaints other than what is documented elsewhere in the note.    The following portions of the chart were reviewed this encounter and updated as appropriate:          Skin Cancer History  Biopsy Log Book  Biopsied Type Location Status   11/3/23 Actinic Keratosis Mid Frontal Scalp Treatment Complete  2/12/24   11/3/23 BCC Right Tip of Nose Treatment Complete  2/12/24   11/3/23 SCC in Situ Mid Parietal Scalp Treatment Complete  2/12/24   11/3/23 Actinic Keratosis Mid Supratip of Nose Treatment Complete  2/12/24 2/19/25 SCC Mid Back Refer Excision   2/19/25 BCC Left Tip of Nose Treatment Complete - Mohs  4/29/25 2/19/25 Actinic Keratosis Right Ear - Tragus No Treatment Required  2/25/25 2/19/25 Actinic Keratosis Left Zygomatic Area No Treatment Required  2/25/25       Additional History      Specialty Problems    None       Objective   Well appearing patient in no apparent distress; mood and affect are within normal limits.    A focused skin examination was performed. All findings within normal limits unless otherwise noted below.    Assessment/Plan   Skin Exam  1. ACTINIC KERATOSIS  Mid-back  Scar c/w recent biopsy  Destr of lesion - Mid-back  Complexity: simple    Destruction method: cryotherapy    Informed consent: discussed and consent obtained    Lesion destroyed using liquid nitrogen: Yes    Region frozen until ice ball extended beyond lesion: Yes    Cryotherapy cycles:  2  Outcome: patient tolerated procedure well with no complications    Post-procedure details: wound care instructions given      Cryotherapy, skin lesion - Mid-back      "

## 2025-08-20 ENCOUNTER — APPOINTMENT (OUTPATIENT)
Dept: DERMATOLOGY | Facility: CLINIC | Age: 89
End: 2025-08-20
Payer: MEDICARE

## 2025-08-20 DIAGNOSIS — Z12.83 SCREENING EXAM FOR SKIN CANCER: ICD-10-CM

## 2025-08-20 DIAGNOSIS — L82.1 SEBORRHEIC KERATOSIS: ICD-10-CM

## 2025-08-20 DIAGNOSIS — L57.0 ACTINIC KERATOSIS: Primary | ICD-10-CM

## 2025-08-20 DIAGNOSIS — Z12.83 SKIN CANCER SCREENING: ICD-10-CM

## 2025-08-20 PROCEDURE — 17003 DESTRUCT PREMALG LES 2-14: CPT | Performed by: NURSE PRACTITIONER

## 2025-08-20 PROCEDURE — 1159F MED LIST DOCD IN RCRD: CPT | Performed by: NURSE PRACTITIONER

## 2025-08-20 PROCEDURE — 17000 DESTRUCT PREMALG LESION: CPT | Performed by: NURSE PRACTITIONER

## 2025-08-20 PROCEDURE — 99213 OFFICE O/P EST LOW 20 MIN: CPT | Performed by: NURSE PRACTITIONER

## 2025-08-20 PROCEDURE — 1160F RVW MEDS BY RX/DR IN RCRD: CPT | Performed by: NURSE PRACTITIONER

## 2025-10-27 ENCOUNTER — APPOINTMENT (OUTPATIENT)
Dept: DERMATOLOGY | Facility: CLINIC | Age: 89
End: 2025-10-27
Payer: MEDICARE

## 2025-12-01 ENCOUNTER — APPOINTMENT (OUTPATIENT)
Dept: DERMATOLOGY | Facility: CLINIC | Age: 89
End: 2025-12-01
Payer: MEDICARE

## (undated) DEVICE — SURGICAL PROCEDURE PACK HND

## (undated) DEVICE — DOUBLE BASIN SET: Brand: MEDLINE INDUSTRIES, INC.

## (undated) DEVICE — GLOVE SURG SZ 65 THK91MIL LTX FREE SYN POLYISOPRENE

## (undated) DEVICE — TRAY SET HAND REUSABLE

## (undated) DEVICE — GLOVE ORANGE PI 8 1/2   MSG9085

## (undated) DEVICE — GLOVE SURG SZ 6 THK91MIL LTX FREE SYN POLYISOPRENE ANTI

## (undated) DEVICE — GOWN,SIRUS,FABRNF,L,20/CS: Brand: MEDLINE

## (undated) DEVICE — PADDING,UNDERCAST,COTTON, 4"X4YD STERILE: Brand: MEDLINE